# Patient Record
Sex: MALE
[De-identification: names, ages, dates, MRNs, and addresses within clinical notes are randomized per-mention and may not be internally consistent; named-entity substitution may affect disease eponyms.]

---

## 2023-03-20 ENCOUNTER — APPOINTMENT (OUTPATIENT)
Dept: ORTHOPEDIC SURGERY | Facility: CLINIC | Age: 73
End: 2023-03-20
Payer: MEDICARE

## 2023-03-20 VITALS — BODY MASS INDEX: 31.76 KG/M2 | HEIGHT: 64 IN | WEIGHT: 186 LBS

## 2023-03-20 DIAGNOSIS — J45.909 UNSPECIFIED ASTHMA, UNCOMPLICATED: ICD-10-CM

## 2023-03-20 DIAGNOSIS — M77.51 OTHER ENTHESOPATHY OF RT FOOT AND ANKLE: ICD-10-CM

## 2023-03-20 DIAGNOSIS — E78.00 PURE HYPERCHOLESTEROLEMIA, UNSPECIFIED: ICD-10-CM

## 2023-03-20 DIAGNOSIS — I10 ESSENTIAL (PRIMARY) HYPERTENSION: ICD-10-CM

## 2023-03-20 DIAGNOSIS — S93.401A SPRAIN OF UNSPECIFIED LIGAMENT OF RIGHT ANKLE, INITIAL ENCOUNTER: ICD-10-CM

## 2023-03-20 DIAGNOSIS — M17.11 UNILATERAL PRIMARY OSTEOARTHRITIS, RIGHT KNEE: ICD-10-CM

## 2023-03-20 PROBLEM — Z00.00 ENCOUNTER FOR PREVENTIVE HEALTH EXAMINATION: Status: ACTIVE | Noted: 2023-03-20

## 2023-03-20 PROCEDURE — 99203 OFFICE O/P NEW LOW 30 MIN: CPT

## 2023-03-20 NOTE — DISCUSSION/SUMMARY
[de-identified] : The patient was advised of the diagnosis.  The natural history of the pathology was explained in full to the patient in layman's terms. All questions were answered.  The risks and benefits of surgical and non-surgical treatment alternatives were explained in full to the patient.\par

## 2023-03-20 NOTE — IMAGING
[AP] : anteroposterior [Lateral] : lateral [[place]] : skyline [Outside films reviewed] : Outside films reviewed [Mild tricompartmental OA medial narrowing] : Mild tricompartmental OA medial narrowing [Right] : right ankle [There are no fractures, subluxations or dislocations. No significant abnormalities are seen] : There are no fractures, subluxations or dislocations. No significant abnormalities are seen [de-identified] : Right knee: No effusion.  Medial tenderness.  ROM 0-125.  NVI.  Lig stable.\par \par Right ankle: No swelling.  Lateral lig tenderness.  No tenderness over Achilles, post tib tendon, peroneal tendon.  NVI. [FreeTextEntry9] : calcified menisci.

## 2023-03-20 NOTE — ASSESSMENT
[FreeTextEntry1] : Mild/mod OA right knee with calcified menisci.  Discussed options and he will pursue OTC treatments, return if pain worsens.  Right ankle tendinitis from overuse - activity as tolerated.

## 2023-03-20 NOTE — HISTORY OF PRESENT ILLNESS
[Gradual] : gradual [2] : 2 [1] : 2 [Dull/Aching] : dull/aching [Occasional] : occasional [Rest] : rest [Walking] : walking [Full time] : Work status: full time [de-identified] : 3/20/23: Right knee and right ankle pain x 1 year, no injury.  No treatments yet.  Went to PCP who ordered XR and recc ortho eval.  Pain with acitivity, has good days and bad days. [FreeTextEntry1] : rt knee,rt foot [FreeTextEntry3] : 1 year ago [FreeTextEntry5] : no specific injury [de-identified] : x-ray

## 2024-10-18 ENCOUNTER — INPATIENT (INPATIENT)
Facility: HOSPITAL | Age: 74
LOS: 4 days | Discharge: HOME | DRG: 312 | End: 2024-10-23
Attending: HOSPITALIST | Admitting: HOSPITALIST
Payer: MEDICARE

## 2024-10-18 ENCOUNTER — RESULT REVIEW (OUTPATIENT)
Age: 74
End: 2024-10-18

## 2024-10-18 VITALS
HEART RATE: 79 BPM | TEMPERATURE: 98 F | DIASTOLIC BLOOD PRESSURE: 62 MMHG | SYSTOLIC BLOOD PRESSURE: 96 MMHG | RESPIRATION RATE: 18 BRPM | WEIGHT: 182.1 LBS | OXYGEN SATURATION: 91 % | HEIGHT: 67 IN

## 2024-10-18 DIAGNOSIS — R55 SYNCOPE AND COLLAPSE: ICD-10-CM

## 2024-10-18 LAB
ALBUMIN SERPL ELPH-MCNC: 4 G/DL — SIGNIFICANT CHANGE UP (ref 3.3–5.2)
ALP SERPL-CCNC: 91 U/L — SIGNIFICANT CHANGE UP (ref 40–120)
ALT FLD-CCNC: 33 U/L — SIGNIFICANT CHANGE UP
ANION GAP SERPL CALC-SCNC: 9 MMOL/L — SIGNIFICANT CHANGE UP (ref 5–17)
AST SERPL-CCNC: 27 U/L — SIGNIFICANT CHANGE UP
BASOPHILS # BLD AUTO: 0.02 K/UL — SIGNIFICANT CHANGE UP (ref 0–0.2)
BASOPHILS NFR BLD AUTO: 0.2 % — SIGNIFICANT CHANGE UP (ref 0–2)
BILIRUB SERPL-MCNC: 0.7 MG/DL — SIGNIFICANT CHANGE UP (ref 0.4–2)
BUN SERPL-MCNC: 19.7 MG/DL — SIGNIFICANT CHANGE UP (ref 8–20)
CALCIUM SERPL-MCNC: 9 MG/DL — SIGNIFICANT CHANGE UP (ref 8.4–10.5)
CHLORIDE SERPL-SCNC: 100 MMOL/L — SIGNIFICANT CHANGE UP (ref 96–108)
CO2 SERPL-SCNC: 27 MMOL/L — SIGNIFICANT CHANGE UP (ref 22–29)
CREAT SERPL-MCNC: 0.92 MG/DL — SIGNIFICANT CHANGE UP (ref 0.5–1.3)
EGFR: 88 ML/MIN/1.73M2 — SIGNIFICANT CHANGE UP
EOSINOPHIL # BLD AUTO: 0.05 K/UL — SIGNIFICANT CHANGE UP (ref 0–0.5)
EOSINOPHIL NFR BLD AUTO: 0.4 % — SIGNIFICANT CHANGE UP (ref 0–6)
GLUCOSE SERPL-MCNC: 166 MG/DL — HIGH (ref 70–99)
HCT VFR BLD CALC: 41 % — SIGNIFICANT CHANGE UP (ref 39–50)
HGB BLD-MCNC: 14 G/DL — SIGNIFICANT CHANGE UP (ref 13–17)
IMM GRANULOCYTES NFR BLD AUTO: 0.2 % — SIGNIFICANT CHANGE UP (ref 0–0.9)
LYMPHOCYTES # BLD AUTO: 0.87 K/UL — LOW (ref 1–3.3)
LYMPHOCYTES # BLD AUTO: 7.8 % — LOW (ref 13–44)
MCHC RBC-ENTMCNC: 30.2 PG — SIGNIFICANT CHANGE UP (ref 27–34)
MCHC RBC-ENTMCNC: 34.1 GM/DL — SIGNIFICANT CHANGE UP (ref 32–36)
MCV RBC AUTO: 88.6 FL — SIGNIFICANT CHANGE UP (ref 80–100)
MONOCYTES # BLD AUTO: 0.57 K/UL — SIGNIFICANT CHANGE UP (ref 0–0.9)
MONOCYTES NFR BLD AUTO: 5.1 % — SIGNIFICANT CHANGE UP (ref 2–14)
NEUTROPHILS # BLD AUTO: 9.6 K/UL — HIGH (ref 1.8–7.4)
NEUTROPHILS NFR BLD AUTO: 86.3 % — HIGH (ref 43–77)
PLATELET # BLD AUTO: 189 K/UL — SIGNIFICANT CHANGE UP (ref 150–400)
POTASSIUM SERPL-MCNC: 4 MMOL/L — SIGNIFICANT CHANGE UP (ref 3.5–5.3)
POTASSIUM SERPL-SCNC: 4 MMOL/L — SIGNIFICANT CHANGE UP (ref 3.5–5.3)
PROT SERPL-MCNC: 6.5 G/DL — LOW (ref 6.6–8.7)
RBC # BLD: 4.63 M/UL — SIGNIFICANT CHANGE UP (ref 4.2–5.8)
RBC # FLD: 13.5 % — SIGNIFICANT CHANGE UP (ref 10.3–14.5)
SODIUM SERPL-SCNC: 136 MMOL/L — SIGNIFICANT CHANGE UP (ref 135–145)
TROPONIN T, HIGH SENSITIVITY RESULT: 32 NG/L — SIGNIFICANT CHANGE UP (ref 0–51)
TROPONIN T, HIGH SENSITIVITY RESULT: 36 NG/L — SIGNIFICANT CHANGE UP (ref 0–51)
TSH SERPL-MCNC: 0.75 UIU/ML — SIGNIFICANT CHANGE UP (ref 0.27–4.2)
WBC # BLD: 11.13 K/UL — HIGH (ref 3.8–10.5)
WBC # FLD AUTO: 11.13 K/UL — HIGH (ref 3.8–10.5)

## 2024-10-18 PROCEDURE — 70450 CT HEAD/BRAIN W/O DYE: CPT | Mod: 26,MC

## 2024-10-18 PROCEDURE — 93306 TTE W/DOPPLER COMPLETE: CPT | Mod: 26

## 2024-10-18 PROCEDURE — 71045 X-RAY EXAM CHEST 1 VIEW: CPT | Mod: 26

## 2024-10-18 PROCEDURE — 99222 1ST HOSP IP/OBS MODERATE 55: CPT

## 2024-10-18 PROCEDURE — 99285 EMERGENCY DEPT VISIT HI MDM: CPT

## 2024-10-18 PROCEDURE — 93010 ELECTROCARDIOGRAM REPORT: CPT

## 2024-10-18 RX ORDER — ONDANSETRON HYDROCHLORIDE 2 MG/ML
4 INJECTION, SOLUTION INTRAMUSCULAR; INTRAVENOUS EVERY 8 HOURS
Refills: 0 | Status: DISCONTINUED | OUTPATIENT
Start: 2024-10-18 | End: 2024-10-23

## 2024-10-18 RX ORDER — MELATONIN 5 MG
3 TABLET ORAL AT BEDTIME
Refills: 0 | Status: DISCONTINUED | OUTPATIENT
Start: 2024-10-18 | End: 2024-10-23

## 2024-10-18 RX ORDER — MAGNESIUM, ALUMINUM HYDROXIDE 200-200 MG
30 TABLET,CHEWABLE ORAL EVERY 4 HOURS
Refills: 0 | Status: DISCONTINUED | OUTPATIENT
Start: 2024-10-18 | End: 2024-10-23

## 2024-10-18 RX ORDER — ACETAMINOPHEN 500 MG
650 TABLET ORAL EVERY 6 HOURS
Refills: 0 | Status: DISCONTINUED | OUTPATIENT
Start: 2024-10-18 | End: 2024-10-23

## 2024-10-18 RX ADMIN — Medication 1000 MILLILITER(S): at 14:56

## 2024-10-18 NOTE — H&P ADULT - HISTORY OF PRESENT ILLNESS
73 y/o male with PMH of HTN, HLD, gout, asthma,to the ED c/o syncope.  Patient was BIBEMS with initially elevated HR to 150.  Patient returned to normal sinus rhythm prior to arrival in ED.  Patient states he was eating lunch when he experienced sudden onset dizziness, vision changes and profuse sweating.  Patient states the next memory he has is waking up seated on a chair in a different location. Per patient, his colleague told him he had lost consciousness twice and was down for approximately 30 seconds during each episode.  Per patient report, there was no witnessed seizure-like activity and no tongue biting. Patient endorsing no symptoms at this time. Patient denies SOB, chest pain, fever, chills, nausea, vomiting, diarrhea, constipation, headache, weakness, dizziness, dysuria, hematuria Patient seen and examined before midnight.     75 y/o male with PMH of HTN, HLD, gout came to the ED s/p syncope. Patient reported abdominal cramp, followed by diaphoresis, dizziness and vision changes during lunch time. He stepped outside and the next thing he saw was his colleague standing next to him and EMS checking his BP. He was told by his colleague that he passed out twice, he was out for about 30 sec. Patient said the EMS mentioned his BP was "185/145". He has no chest pain, shortness of breath, palpitation, nausea, vomiting, tongue laceration, change/incontinence bowel/urinary, fever, chills, cough, HA, numbness. tingling. No prior similar episode.

## 2024-10-18 NOTE — H&P ADULT - NSHPPHYSICALEXAM_GEN_ALL_CORE
Vital Signs Last 24 Hrs  T(C): 36.6 (18 Oct 2024 15:46), Max: 36.6 (18 Oct 2024 15:46)  T(F): 97.8 (18 Oct 2024 15:46), Max: 97.8 (18 Oct 2024 15:46)  HR: 83 (18 Oct 2024 15:46) (79 - 83)  BP: 115/64 (18 Oct 2024 15:46) (96/62 - 115/64)  BP(mean): --  RR: 18 (18 Oct 2024 15:46) (18 - 18)  SpO2: 98% (18 Oct 2024 15:46) (91% - 98%)    Parameters below as of 18 Oct 2024 15:46  Patient On (Oxygen Delivery Method): room air

## 2024-10-18 NOTE — ED ADULT NURSE NOTE - NSFALLRISKINTERV_ED_ALL_ED

## 2024-10-18 NOTE — ED PROVIDER NOTE - ATTENDING CONTRIBUTION TO CARE
I, Tyler Munoz MD, performed the initial face to face bedside interview with this patient regarding history of present illness, review of symptoms and relevant past medical, social and family history.  I completed an independent physical examination.  I was the initial provider who evaluated this patient. I have signed out the follow up of any pending tests (i.e. labs, radiological studies) to the resident.  I have communicated the patient’s plan of care and disposition with the resident.    74 year old male PMHx HTN c/o syncope. PE unremarkable. labs, ECG and diagnostic imaging results reviewed

## 2024-10-18 NOTE — CONSULT NOTE ADULT - ATTENDING COMMENTS
74-year-old-year-old male with HTN, HLD, gout, asthma, ex-smoker presents to the ED c/o syncope.      Patient was BIBEMS with initially elevated HR to 150.  Patient returned to normal sinus rhythm prior to arrival in ED.      Patient states he was eating lunch when he experienced sudden onset dizziness, vision changes and profuse sweating.  Patient states the next memory he has is waking up seated on a chair in a different location. Per patient, his colleague told him he had lost consciousness twice and was down for approximately 30 seconds during each episode.  Per patient report, there was no witnessed seizure-like activity and no tongue biting. Patient endorsing no symptoms at this time. Patient denies SOB, chest pain, fever, chills, nausea, vomiting, diarrhea, constipation, headache, weakness, dizziness, dysuria, hematuria.    Syncope  Cardiac vs Other etiology  - continue telemetry monitoring  - EKG  - TSH   - Lipid panel  - A1C  - Orthostatics  - TTE  - recommend Head CT  - 30 Day MCOT  - troponin x1 negative; repeat one more troponin  - we will follow up on the echo and labs

## 2024-10-18 NOTE — ED PROVIDER NOTE - PHYSICAL EXAMINATION
General: Awake, alert, lying in bed in NAD  HEENT: Normocephalic, atraumatic. No scleral icterus or conjunctival injection. EOMI.  Neck:. Soft and supple.  Cardiac: RRR, S1/S2 present  Resp: Lungs CTAB. Symmetric chest expansion with inspiration. No accessory muscle use  Abd: Soft, non-tender, non-distended. No guarding, rebound, or rigidity.  Skin: No rashes, abrasions, or lacerations.  Extremities: Palpable DP pulses bilaterally. No LE edema bilaterally.  Neuro: AO x 4. Moves all extremities symmetrically. Motor strength and sensation grossly intact.  Psych: Appropriate mood and affect

## 2024-10-18 NOTE — CONSULT NOTE ADULT - TIME BILLING
syncope  HTN, HLD, gout, asthma, ex-smoker syncope  HTN, HLD, gout, asthma, ex-smoker    If this is vagovagal syncope, which it likely is, and he had no prior syncope in the past 12 months, then no driving restrictions. Ref. Yakelin AE, Gurwinder WM, Tiffanie DG, et al. Personal and public safety issues related to arrhythmias that may affect consciousness: implications for regulation and physician recommendations. A medical/scientific statement from the American Heart Association and the North American Society of Pacing and Electrophysiology. Circulation 1996; 94:1147-66.

## 2024-10-18 NOTE — ED PROVIDER NOTE - OBJECTIVE STATEMENT
74-year-old-year-old male with PMH of HTN, HLD, gout presents to the ED c/o syncope.  Patient was GERARD BIBEMS with initially elevated HR to 150.  Patient returned to normal sinus rhythm prior to arrival in ED.  Patient states 74-year-old-year-old male with PMH of HTN, HLD, gout, ex-smoker presents to the ED c/o syncope.  Patient was GERARD BIBEMS with initially elevated HR to 150.  Patient returned to normal sinus rhythm prior to arrival in ED.  Patient states 74-year-old-year-old male with PMH of HTN, HLD, gout, asthma, ex-smoker presents to the ED c/o syncope.  Patient was BIBEMS with initially elevated HR to 150.  Patient returned to normal sinus rhythm prior to arrival in ED.  Patient states he was eating lunch when he experienced sudden onset dizziness, vision changes and profuse sweating.  Patient states the next memory he has is waking up seated on a chair in a different location. Per patient, his colleague told him he had lost consciousness twice and was down for approximately 30 seconds during each episode.  Per patient report, there was no witnessed seizure-like activity and no tongue biting. Patient endorsing no symptoms at this time. Patient denies SOB, chest pain, fever, chills, nausea, vomiting, diarrhea, constipation, headache, weakness, dizziness, dysuria, hematuria.

## 2024-10-18 NOTE — ED ADULT NURSE NOTE - CHIEF COMPLAINT QUOTE
BIBA seconadary to lower abdominal pain dizziness and multiple episodes of syncope while at work today. Upon EMS arrival pt was cool pale and diaphoretic with 157 HR. Pt currently in NSR in [facility]. Pt denies Abd pain, chest pain, dizziness or SOB at this time.

## 2024-10-18 NOTE — ED ADULT NURSE NOTE - OBJECTIVE STATEMENT
Pt A+Ox4, respirations are equal and unlabored on room air. Pt states that he started to experience ABD pain and dizziness and passed out 2x while at work. Pt states that he felt off and sat down, denies falling when syncope occurred. Pt states LOC lasted for not more then a few seconds. Pt states "I feel fine at this time". Pt states the symptoms he experienced has now subsided. Pt denies SOB, CP, ABD pain, HA, N/V/D. Pt on telebox at this time. Pt left in position of comfort, wheels of stretcher locked and in the lowest position. Call bell within reach.

## 2024-10-18 NOTE — CONSULT NOTE ADULT - SUBJECTIVE AND OBJECTIVE BOX
[facility]                                              CARDIOLOGY AT [address], [place] [address]                                             Telephone: [phone]. Fax:[phone]                                                       CARDIOLOGY CONSULTATION NOTE                                                                                             History obtained by: Patient and medical record  Community Cardiologist: no   obtained: Yes [  ] No [x  ]  Reason for Consultation: syncope  Available out pt records reviewed: Yes [  x] No [  ]    Chief complaint:    Patient is a 74y old  Male who presents with a chief complaint of syncope    HPI: 74-year-old-year-old male with PMH of HTN, HLD, gout, asthma, ex-smoker presents to the ED c/o syncope.  Patient was BIBEMS with initially elevated HR to 150.  Patient returned to normal sinus rhythm prior to arrival in ED.  Patient states he was eating lunch when he experienced sudden onset dizziness, vision changes and profuse sweating.  Patient states the next memory he has is waking up seated on a chair in a different location. Per patient, his colleague told him he had lost consciousness twice and was down for approximately 30 seconds during each episode.  Per patient report, there was no witnessed seizure-like activity and no tongue biting. Patient endorsing no symptoms at this time. Patient denies SOB, chest pain, fever, chills, nausea, vomiting, diarrhea, constipation, headache, weakness, dizziness, dysuria, hematuria.    Pt was examined at the bedside. Reports feeling stomach cramps after lunch at work and then seeing very bright in his eyes. Pt says he went outside to sit with his coworkers and per his coworkers passed out twice for few seconds. Denies seizure, denies, falling, denies feeling dizzy, lightheaded before. Pt denies chest pain, SOB, N/V/D chills or fevers, lower extremity edema.       CARDIAC TESTING   ECHO:    STRESS:    CATH:     ELECTROPHYSIOLOGY:     PAST MEDICAL HISTORY  Hypertension    Dyslipidemia        PAST SURGICAL HISTORY  No significant past surgical history        SOCIAL HISTORY:  Denies smoking/alcohol/drugs  CIGARETTES:     ALCOHOL:  DRUGS:    FAMILY HISTORY:  No pertinent family history in first degree relatives      Family History of Cardiovascular Disease:  Yes [  ] No [  ]  Coronary Artery Disease in first degree relative: Yes [  ] No [  ]  Sudden Cardiac Death in First degree relative: Yes [  ] No [  ]    HOME MEDICATIONS:      CURRENT CARDIAC MEDICATIONS:      CURRENT OTHER MEDICATIONS:      ALLERGIES:   No Known Allergies      REVIEW OF SYMPTOMS:   CONSTITUTIONAL: No fever, no chills, no weight loss, no weight gain, no fatigue   ENMT:  No vertigo; No sinus or throat pain  NECK: No pain or stiffness  CARDIOVASCULAR: No chest pain, no dyspnea, no syncope/presyncope, no palpitations, no dizziness, no Orthopnea, no Paroxsymal nocturnal dyspnea  RESPIRATORY: no Shortness of breath, no cough, no wheezing  : No dysuria, no hematuria   GI: No dark color stool, no nausea, no diarrhea, no constipation, no abdominal pain   NEURO: No headache, no slurred speech   MUSCULOSKELETAL: No joint pain or swelling; No muscle, back, or extremity pain  PSYCH: No agitation, no anxiety.    ALL OTHER REVIEW OF SYSTEMS ARE NEGATIVE.    VITAL SIGNS:  T(C): 36.6 (10-18-24 @ 15:46), Max: 36.6 (10-18-24 @ 15:46)  T(F): 97.8 (10-18-24 @ 15:46), Max: 97.8 (10-18-24 @ 15:46)  HR: 83 (10-18-24 @ 15:46) (79 - 83)  BP: 115/64 (10-18-24 @ 15:46) (96/62 - 115/64)  RR: 18 (10-18-24 @ 15:46) (18 - 18)  SpO2: 98% (10-18-24 @ 15:46) (91% - 98%)    INTAKE AND OUTPUT:       PHYSICAL EXAM:  Constitutional: Comfortable . No acute distress.   HEENT: Atraumatic and normocephalic , neck is supple . no JVD. No carotid bruit.  CNS: A&Ox3. No focal deficits.   Respiratory: CTAB, unlabored   Cardiovascular: RRR normal s1 s2. No murmur. No rubs or gallop.  Gastrointestinal: Soft, non-tender. +Bowel sounds.   Extremities: 2+ Peripheral Pulses, No clubbing, cyanosis, or edema  Psychiatric: Calm . no agitation.   Skin: Warm and dry, no ulcers on extremities     LABS:                            14.0   11.13 )-----------( 189      ( 18 Oct 2024 14:51 )             41.0     10-18    136  |  100  |  19.7  ----------------------------<  166[H]  4.0   |  27.0  |  0.92    Ca    9.0      18 Oct 2024 14:51    TPro  6.5[L]  /  Alb  4.0  /  TBili  0.7  /  DBili  x   /  AST  27  /  ALT  33  /  AlkPhos  91  10-18      Urinalysis Basic - ( 18 Oct 2024 14:51 )    Color: x / Appearance: x / SG: x / pH: x  Gluc: 166 mg/dL / Ketone: x  / Bili: x / Urobili: x   Blood: x / Protein: x / Nitrite: x   Leuk Esterase: x / RBC: x / WBC x   Sq Epi: x / Non Sq Epi: x / Bacteria: x              INTERPRETATION OF TELEMETRY:     ECG:   Prior ECG: Yes [  ] No [  ]    RADIOLOGY & ADDITIONAL STUDIES:    X-ray:    CT scan:   MRI:   US:                                                [facility]                                              CARDIOLOGY AT [place] [address]                                             Telephone: [phone]. Fax:[phone]                                                       CARDIOLOGY CONSULTATION NOTE                                                                                             History obtained by: Patient and medical record  Community Cardiologist: no   obtained: Yes [  ] No [x  ]  Reason for Consultation: syncope  Available out pt records reviewed: Yes [  x] No [  ]    Chief complaint:    Patient is a 74y old  Male who presents with a chief complaint of syncope    HPI: 74-year-old-year-old male with PMH of HTN, HLD, gout, asthma, ex-smoker presents to the ED c/o syncope.  Patient was BIBEMS with initially elevated HR to 150.  Patient returned to normal sinus rhythm prior to arrival in ED.  Patient states he was eating lunch when he experienced sudden onset dizziness, vision changes and profuse sweating.  Patient states the next memory he has is waking up seated on a chair in a different location. Per patient, his colleague told him he had lost consciousness twice and was down for approximately 30 seconds during each episode.  Per patient report, there was no witnessed seizure-like activity and no tongue biting. Patient endorsing no symptoms at this time. Patient denies SOB, chest pain, fever, chills, nausea, vomiting, diarrhea, constipation, headache, weakness, dizziness, dysuria, hematuria.    Pt was examined at the bedside. Reports feeling stomach cramps after lunch at work and then seeing "very bright" in his eyes. Pt says he went outside to sit with his coworkers and per his coworkers passed out twice for few seconds. Denies seizure, denies, falling, denies feeling dizzy, lightheaded before. Pt denies chest pain, SOB, N/V/D chills or fevers, lower extremity edema. Pt reports occasionally getting lightheaded when changing position from lying down to sitting up. Reports never having a syncopal episode in the past. Reports never following with cardiology.      CARDIAC TESTING denies      ELECTROPHYSIOLOGY: denies    PAST MEDICAL HISTORY  Hypertension    Dyslipidemia        PAST SURGICAL HISTORY  No significant past surgical history        SOCIAL HISTORY:  Denies smoking/alcohol/drugs  CIGARETTES:   former   ALCOHOL: rarely  DRUGS: denies    FAMILY HISTORY:  No pertinent family history in first degree relatives  History of Colon Cancer - Dad, Grandfather    HOME MEDICATIONS:  Allopurinol 300 mg   Valsartan -hydrochlorothiazide 160-12.5 mg  Atorvastatin 80 mg      CURRENT CARDIAC MEDICATIONS:      CURRENT OTHER MEDICATIONS:      ALLERGIES:   No Known Allergies      REVIEW OF SYMPTOMS:   CONSTITUTIONAL: No fever, no chills, no weight loss, no weight gain, no fatigue   ENMT:  No vertigo; No sinus or throat pain  NECK: No pain or stiffness  CARDIOVASCULAR: No chest pain, no dyspnea, no syncope/presyncope, no palpitations, no dizziness, no Orthopnea, no Paroxsymal nocturnal dyspnea  RESPIRATORY: no Shortness of breath, no cough, no wheezing  : No dysuria, no hematuria   GI: No dark color stool, no nausea, no diarrhea, no constipation, no abdominal pain   NEURO: No headache, no slurred speech   MUSCULOSKELETAL: No joint pain or swelling; No muscle, back, or extremity pain  PSYCH: No agitation, no anxiety.    ALL OTHER REVIEW OF SYSTEMS ARE NEGATIVE.    VITAL SIGNS:  T(C): 36.6 (10-18-24 @ 15:46), Max: 36.6 (10-18-24 @ 15:46)  T(F): 97.8 (10-18-24 @ 15:46), Max: 97.8 (10-18-24 @ 15:46)  HR: 83 (10-18-24 @ 15:46) (79 - 83)  BP: 115/64 (10-18-24 @ 15:46) (96/62 - 115/64)  RR: 18 (10-18-24 @ 15:46) (18 - 18)  SpO2: 98% (10-18-24 @ 15:46) (91% - 98%)    INTAKE AND OUTPUT:       PHYSICAL EXAM:  Constitutional: Comfortable . No acute distress.   HEENT: Atraumatic and normocephalic , neck is supple . no JVD. No carotid bruit.  CNS: A&Ox3. No focal deficits.   Respiratory: CTAB, unlabored   Cardiovascular: RRR normal s1 s2. No murmur. No rubs or gallop.  Gastrointestinal: Soft, non-tender. +Bowel sounds.   Extremities: 2+ Peripheral Pulses, No clubbing, cyanosis, or edema  Psychiatric: Calm . no agitation.   Skin: Warm and dry, no ulcers on extremities     LABS:                            14.0   11.13 )-----------( 189      ( 18 Oct 2024 14:51 )             41.0     10-18    136  |  100  |  19.7  ----------------------------<  166[H]  4.0   |  27.0  |  0.92    Ca    9.0      18 Oct 2024 14:51    TPro  6.5[L]  /  Alb  4.0  /  TBili  0.7  /  DBili  x   /  AST  27  /  ALT  33  /  AlkPhos  91  10-18      Urinalysis Basic - ( 18 Oct 2024 14:51 )    Color: x / Appearance: x / SG: x / pH: x  Gluc: 166 mg/dL / Ketone: x  / Bili: x / Urobili: x   Blood: x / Protein: x / Nitrite: x   Leuk Esterase: x / RBC: x / WBC x   Sq Epi: x / Non Sq Epi: x / Bacteria: x              INTERPRETATION OF TELEMETRY:     ECG:   Prior ECG: Yes [  ] No [ x ]    RADIOLOGY & ADDITIONAL STUDIES:    X-ray:    CT scan:   MRI:   US:

## 2024-10-18 NOTE — ED PROVIDER NOTE - NS ED ROS FT
Current ROS in ED    General: Denies fever, chills  HEENT: Denies sore throat  Neck: Denies neck pain  Resp: Denies coughing, SOB  Cardiovascular: Denies CP, palpitations, LE edema  GI: Denies nausea, vomiting, abdominal pain, diarrhea, constipation, blood in stool  : Denies dysuria, hematuria  MSK: Denies back pain  Neuro: Denies headache, dizziness, numbness, weakness  Skin: Denies rashes.

## 2024-10-18 NOTE — CONSULT NOTE ADULT - ASSESSMENT
74-year-old-year-old male with PMH of HTN, HLD, gout, asthma, ex-smoker presents to the ED c/o syncope. Cardiology was consulted for syncope.       Syncope  Cardiac vs Other etiology  - Pt on telemetry monitoring  - EKG  - TSH   - Lipid panel  - A1C  - Orthostatics  - TTE  - recommend Head CT  - 30 Day MCOT  - troponin x1 negative

## 2024-10-18 NOTE — H&P ADULT - NSICDXPASTMEDICALHX_GEN_ALL_CORE_FT
PAST MEDICAL HISTORY:  Dyslipidemia     Hypertension      PAST MEDICAL HISTORY:  Dyslipidemia     Gout     Hypertension

## 2024-10-18 NOTE — ED ADULT TRIAGE NOTE - CHIEF COMPLAINT QUOTE
BIBA seconadary to lower abdominal pain dizziness and multiple episodes of syncope while at work today. Upon EMS arrival pt was cool pale and diaphoretic with 157 HR. Pt currently in NSR in [facility]. Pt denies Abd pain, chest pain, dizziness or SOB at this time.

## 2024-10-18 NOTE — H&P ADULT - NSICDXFAMILYHX_GEN_ALL_CORE_FT
FAMILY HISTORY:  No pertinent family history in first degree relatives     FAMILY HISTORY:  Sibling  Still living? Yes, Estimated age: Age Unknown  FH: HTN (hypertension), Age at diagnosis: Age Unknown

## 2024-10-18 NOTE — ED PROVIDER NOTE - CLINICAL SUMMARY MEDICAL DECISION MAKING FREE TEXT BOX
74-year-old-year-old male with PMH of HTN, HLD, gout, asthma, ex-smoker presents to the ED c/o syncope.  Patient was BIBEMS with initially elevated HR to 150.  Patient returned to normal sinus rhythm prior to arrival in ED.  Patient states he was eating lunch when he experienced sudden onset dizziness, vision changes and profuse sweating. Per patient, his colleague told him he had lost consciousness twice and was down for approximately 30 seconds during each episode. Patient endorsing no symptoms at this time. Patient is HD acceptable.  EKG shows NSR with Q waves.  Cardiology was consulted for further evaluation.  Will reassess.

## 2024-10-18 NOTE — ED ADULT TRIAGE NOTE - NS ED TRIAGE AVPU SCALE
Follow up scheduled.     ----- Message from Judith Howard NP sent at 12/1/2020  8:32 AM CST -----  Needs a follow up      
Alert-The patient is alert, awake and responds to voice. The patient is oriented to time, place, and person. The triage nurse is able to obtain subjective information.

## 2024-10-18 NOTE — H&P ADULT - ASSESSMENT
75 y/o male with PMH of HTN, HLD, gout came to the ED s/p syncope. In the ED, CT head: no acute intracranial finding.     Syncope   Admit to telemetry   ECG: NSR with premature atrial complex   Troponin x 2 done (32--->36)   Echo done with wall motion abnormality   ED attending reportedly communicate the result of echo to cardiology, will see in AM     HTN/HLD   Continue Valsartan-HCTZ 160-12.5mg   Will hold HCTZ if patient is scheduled for cardiac cath   Atorvastatin 80mg   Monitor BP     Pre-DM-2   HbA1C: 6.0  Lifestyle modification     Gout  Allopurinol 300mg     Supportive   DVT prophylaxis: Lovenox   Diet: cardiac     Plan of care discussed with patient, brother at bedside and ER nurse     Dispo: when stable, home.

## 2024-10-19 DIAGNOSIS — I10 ESSENTIAL (PRIMARY) HYPERTENSION: ICD-10-CM

## 2024-10-19 DIAGNOSIS — E78.5 HYPERLIPIDEMIA, UNSPECIFIED: ICD-10-CM

## 2024-10-19 DIAGNOSIS — R55 SYNCOPE AND COLLAPSE: ICD-10-CM

## 2024-10-19 LAB
ANION GAP SERPL CALC-SCNC: 10 MMOL/L — SIGNIFICANT CHANGE UP (ref 5–17)
BUN SERPL-MCNC: 16.7 MG/DL — SIGNIFICANT CHANGE UP (ref 8–20)
CALCIUM SERPL-MCNC: 8.7 MG/DL — SIGNIFICANT CHANGE UP (ref 8.4–10.5)
CHLORIDE SERPL-SCNC: 104 MMOL/L — SIGNIFICANT CHANGE UP (ref 96–108)
CO2 SERPL-SCNC: 26 MMOL/L — SIGNIFICANT CHANGE UP (ref 22–29)
CREAT SERPL-MCNC: 0.73 MG/DL — SIGNIFICANT CHANGE UP (ref 0.5–1.3)
EGFR: 97 ML/MIN/1.73M2 — SIGNIFICANT CHANGE UP
FOLATE SERPL-MCNC: 14.9 NG/ML — SIGNIFICANT CHANGE UP
GLUCOSE SERPL-MCNC: 118 MG/DL — HIGH (ref 70–99)
HCT VFR BLD CALC: 40.6 % — SIGNIFICANT CHANGE UP (ref 39–50)
HGB BLD-MCNC: 13.9 G/DL — SIGNIFICANT CHANGE UP (ref 13–17)
MCHC RBC-ENTMCNC: 30.7 PG — SIGNIFICANT CHANGE UP (ref 27–34)
MCHC RBC-ENTMCNC: 34.2 GM/DL — SIGNIFICANT CHANGE UP (ref 32–36)
MCV RBC AUTO: 89.6 FL — SIGNIFICANT CHANGE UP (ref 80–100)
PLATELET # BLD AUTO: 192 K/UL — SIGNIFICANT CHANGE UP (ref 150–400)
POTASSIUM SERPL-MCNC: 4 MMOL/L — SIGNIFICANT CHANGE UP (ref 3.5–5.3)
POTASSIUM SERPL-SCNC: 4 MMOL/L — SIGNIFICANT CHANGE UP (ref 3.5–5.3)
RBC # BLD: 4.53 M/UL — SIGNIFICANT CHANGE UP (ref 4.2–5.8)
RBC # FLD: 13.4 % — SIGNIFICANT CHANGE UP (ref 10.3–14.5)
SODIUM SERPL-SCNC: 140 MMOL/L — SIGNIFICANT CHANGE UP (ref 135–145)
TROPONIN T, HIGH SENSITIVITY RESULT: 36 NG/L — SIGNIFICANT CHANGE UP (ref 0–51)
WBC # BLD: 7.94 K/UL — SIGNIFICANT CHANGE UP (ref 3.8–10.5)
WBC # FLD AUTO: 7.94 K/UL — SIGNIFICANT CHANGE UP (ref 3.8–10.5)

## 2024-10-19 PROCEDURE — 99232 SBSQ HOSP IP/OBS MODERATE 35: CPT | Mod: 25

## 2024-10-19 PROCEDURE — 99233 SBSQ HOSP IP/OBS HIGH 50: CPT

## 2024-10-19 RX ORDER — ASPIRIN/MAG CARB/ALUMINUM AMIN 325 MG
81 TABLET ORAL DAILY
Refills: 0 | Status: DISCONTINUED | OUTPATIENT
Start: 2024-10-19 | End: 2024-10-23

## 2024-10-19 RX ORDER — ENOXAPARIN SODIUM 40MG/0.4ML
40 SYRINGE (ML) SUBCUTANEOUS EVERY 24 HOURS
Refills: 0 | Status: DISCONTINUED | OUTPATIENT
Start: 2024-10-19 | End: 2024-10-23

## 2024-10-19 RX ORDER — VALSARTAN 160 MG/1
160 TABLET ORAL DAILY
Refills: 0 | Status: DISCONTINUED | OUTPATIENT
Start: 2024-10-19 | End: 2024-10-19

## 2024-10-19 RX ORDER — INFLUENZ VIR VAC TV P-SURF2003 15MCG/.5ML
0.5 SYRINGE (ML) INTRAMUSCULAR ONCE
Refills: 0 | Status: DISCONTINUED | OUTPATIENT
Start: 2024-10-19 | End: 2024-10-23

## 2024-10-19 RX ORDER — ALLOPURINOL 100 MG
1 TABLET ORAL
Refills: 0 | DISCHARGE

## 2024-10-19 RX ORDER — VALSARTAN 160 MG/1
80 TABLET ORAL DAILY
Refills: 0 | Status: DISCONTINUED | OUTPATIENT
Start: 2024-10-20 | End: 2024-10-23

## 2024-10-19 RX ORDER — ALLOPURINOL 100 MG
300 TABLET ORAL DAILY
Refills: 0 | Status: DISCONTINUED | OUTPATIENT
Start: 2024-10-19 | End: 2024-10-23

## 2024-10-19 RX ORDER — VALSARTAN AND HYDROCHLOROTHIAZIDE 12.5; 8 MG/1; MG/1
1 TABLET, FILM COATED ORAL
Refills: 0 | DISCHARGE

## 2024-10-19 RX ADMIN — Medication 81 MILLIGRAM(S): at 13:18

## 2024-10-19 RX ADMIN — Medication 40 MILLIGRAM(S): at 13:18

## 2024-10-19 RX ADMIN — Medication 300 MILLIGRAM(S): at 13:18

## 2024-10-19 RX ADMIN — VALSARTAN 160 MILLIGRAM(S): 160 TABLET ORAL at 06:04

## 2024-10-19 RX ADMIN — Medication 80 MILLIGRAM(S): at 22:14

## 2024-10-19 NOTE — PATIENT PROFILE ADULT - FUNCTIONAL ASSESSMENT - DAILY ACTIVITY SCORE.
24 Oxybutynin Counseling:  I discussed with the patient the risks of oxybutynin including but not limited to skin rash, drowsiness, dry mouth, difficulty urinating, and blurred vision.

## 2024-10-19 NOTE — PROGRESS NOTE ADULT - SUBJECTIVE AND OBJECTIVE BOX
[facility]                                                         CARDIOLOGY AT [facility]                                                                  [address], [place] [address]                                                         Telephone: [phone]. Fax:[phone]                                                                             PROGRESS NOTE    Reason for follow up: Syncope   Update: Pt seen and examined at bedside. Denies any cardiac complaints. Tele with no events.       Review of symptoms:   Cardiac:  No chest pain. No dyspnea. No palpitations.  Respiratory: no cough. No dyspnea  Gastrointestinal: No diarrhea. No abdominal pain. No bleeding.   Neuro: No focal neuro complaints.    Vitals:  T(C): 36.6 (10-19-24 @ 11:51), Max: 36.8 (10-19-24 @ 05:06)  HR: 73 (10-19-24 @ 11:51) (73 - 83)  BP: 118/71 (10-19-24 @ 11:51) (109/68 - 130/80)  RR: 18 (10-19-24 @ 11:51) (17 - 18)  SpO2: 98% (10-19-24 @ 11:51) (96% - 98%)  Wt(kg): --  I&O's Summary    Weight (kg): 82.6 (10-18 @ 13:24)    PHYSICAL EXAM:  Appearance: Comfortable. No acute distress  HEENT:  Atraumatic. Normocephalic.  Normal oral mucosa  Neurologic: A & O x 3, no gross focal deficits.  Cardiovascular: RRR S1 S2, No murmur, no rubs/gallops. No JVD  Respiratory: Lungs clear to auscultation, unlabored   Gastrointestinal:  Soft, Non-tender, + BS  Lower Extremities: 2+ Peripheral Pulses, No clubbing, cyanosis, or edema  Psychiatry: Patient is calm. No agitation.   Skin: warm and dry.    CURRENT CARDIAC MEDICATIONS:  hydrochlorothiazide 12.5 milliGRAM(s) Oral daily      CURRENT OTHER MEDICATIONS:  acetaminophen     Tablet .. 650 milliGRAM(s) Oral every 6 hours PRN Temp greater or equal to 38C (100.4F), Mild Pain (1 - 3)  melatonin 3 milliGRAM(s) Oral at bedtime PRN Insomnia  ondansetron Injectable 4 milliGRAM(s) IV Push every 8 hours PRN Nausea and/or Vomiting  aluminum hydroxide/magnesium hydroxide/simethicone Suspension 30 milliLiter(s) Oral every 4 hours PRN Dyspepsia  allopurinol 300 milliGRAM(s) Oral daily  atorvastatin 80 milliGRAM(s) Oral at bedtime  aspirin enteric coated 81 milliGRAM(s) Oral daily  enoxaparin Injectable 40 milliGRAM(s) SubCutaneous every 24 hours  influenza  Vaccine (HIGH DOSE) 0.5 milliLiter(s) IntraMuscular once      LABS:	 	                            13.9   7.94  )-----------( 192      ( 19 Oct 2024 04:46 )             40.6     10-19    140  |  104  |  16.7  ----------------------------<  118[H]  4.0   |  26.0  |  0.73    Ca    8.7      19 Oct 2024 04:46    TPro  6.5[L]  /  Alb  4.0  /  TBili  0.7  /  DBili  x   /  AST  27  /  ALT  33  /  AlkPhos  91  10-18      Lipid Profile: Date: 10-18 @ 14:51  Total cholesterol 161; Direct LDL: --; HDL: 43; Triglycerides:109    TSH: Thyroid Stimulating Hormone, Serum: 0.75 uIU/mL  Thyroid Stimulating Hormone, Serum: 0.94 uIU/mL      TELEMETRY: SR, no events    DIAGNOSTIC TESTING:  [ ] Echocardiogram: < from: TTE W or WO Ultrasound Enhancing Agent (10.18.24 @ 19:26) >   1. Left ventricular wall thickness is mildly increased. Left ventricular systolic function is low normal with an ejection fraction of 53 % by Osullivan's method of disks with an ejection fraction visually estimated at 50 to 55 %.   2. Mid inferolateral segment is abnormal.   3. Normal right ventricular cavity size and normal right ventricular systolic function.   4. Normal left and right atrial size.   5. There is focal calcification of the aortic valve leaflets.   6. There is calcification along the mitral-aortic intervalvular fibrosa.   7. There is moderate calcification of the mitral valve annulus.   8. Pulmonary artery systolic pressure could not be estimated.   9. No pericardial effusion seen.  10. No prior echocardiogram is available for comparison.    < end of copied text >                                                                    [facility]                                                         CARDIOLOGY AT [facility]                                                                  [address], [place] [address]                                                         Telephone: [phone]. Fax:[phone]                                                                             PROGRESS NOTE    Reason for follow up: Syncope   Update: Pt seen and examined at bedside. Denies any cardiac complaints. Tele with no events      Review of symptoms:   Cardiac:  No chest pain. No dyspnea. No palpitations.  Respiratory: no cough. No dyspnea  Gastrointestinal: No diarrhea. No abdominal pain. No bleeding.   Neuro: No focal neuro complaints.    Vitals:  T(C): 36.6 (10-19-24 @ 11:51), Max: 36.8 (10-19-24 @ 05:06)  HR: 73 (10-19-24 @ 11:51) (73 - 83)  BP: 118/71 (10-19-24 @ 11:51) (109/68 - 130/80)  RR: 18 (10-19-24 @ 11:51) (17 - 18)  SpO2: 98% (10-19-24 @ 11:51) (96% - 98%)  Wt(kg): --  I&O's Summary    Weight (kg): 82.6 (10-18 @ 13:24)    PHYSICAL EXAM:  Appearance: Comfortable. No acute distress  HEENT:  Atraumatic. Normocephalic.  Normal oral mucosa  Neurologic: A & O x 3, no gross focal deficits.  Cardiovascular: RRR S1 S2, No murmur, no rubs/gallops. No JVD  Respiratory: Lungs clear to auscultation, unlabored   Gastrointestinal:  Soft, Non-tender, + BS  Lower Extremities: 2+ Peripheral Pulses, No clubbing, cyanosis, or edema  Psychiatry: Patient is calm. No agitation.   Skin: warm and dry.    CURRENT CARDIAC MEDICATIONS:  hydrochlorothiazide 12.5 milliGRAM(s) Oral daily      CURRENT OTHER MEDICATIONS:  acetaminophen     Tablet .. 650 milliGRAM(s) Oral every 6 hours PRN Temp greater or equal to 38C (100.4F), Mild Pain (1 - 3)  melatonin 3 milliGRAM(s) Oral at bedtime PRN Insomnia  ondansetron Injectable 4 milliGRAM(s) IV Push every 8 hours PRN Nausea and/or Vomiting  aluminum hydroxide/magnesium hydroxide/simethicone Suspension 30 milliLiter(s) Oral every 4 hours PRN Dyspepsia  allopurinol 300 milliGRAM(s) Oral daily  atorvastatin 80 milliGRAM(s) Oral at bedtime  aspirin enteric coated 81 milliGRAM(s) Oral daily  enoxaparin Injectable 40 milliGRAM(s) SubCutaneous every 24 hours  influenza  Vaccine (HIGH DOSE) 0.5 milliLiter(s) IntraMuscular once      LABS:	 	                            13.9   7.94  )-----------( 192      ( 19 Oct 2024 04:46 )             40.6     10-19    140  |  104  |  16.7  ----------------------------<  118[H]  4.0   |  26.0  |  0.73    Ca    8.7      19 Oct 2024 04:46    TPro  6.5[L]  /  Alb  4.0  /  TBili  0.7  /  DBili  x   /  AST  27  /  ALT  33  /  AlkPhos  91  10-18      Lipid Profile: Date: 10-18 @ 14:51  Total cholesterol 161; Direct LDL: --; HDL: 43; Triglycerides:109    TSH: Thyroid Stimulating Hormone, Serum: 0.75 uIU/mL  Thyroid Stimulating Hormone, Serum: 0.94 uIU/mL      TELEMETRY: SR, no events    DIAGNOSTIC TESTING:  [ ] Echocardiogram: < from: TTE W or WO Ultrasound Enhancing Agent (10.18.24 @ 19:26) >   1. Left ventricular wall thickness is mildly increased. Left ventricular systolic function is low normal with an ejection fraction of 53 % by Osullivan's method of disks with an ejection fraction visually estimated at 50 to 55 %.   2. Mid inferolateral segment is abnormal.   3. Normal right ventricular cavity size and normal right ventricular systolic function.   4. Normal left and right atrial size.   5. There is focal calcification of the aortic valve leaflets.   6. There is calcification along the mitral-aortic intervalvular fibrosa.   7. There is moderate calcification of the mitral valve annulus.   8. Pulmonary artery systolic pressure could not be estimated.   9. No pericardial effusion seen.  10. No prior echocardiogram is available for comparison.    < end of copied text >

## 2024-10-19 NOTE — PATIENT PROFILE ADULT - FALL HARM RISK - UNIVERSAL INTERVENTIONS
Bed in lowest position, wheels locked, appropriate side rails in place/Call bell, personal items and telephone in reach/Instruct patient to call for assistance before getting out of bed or chair/Non-slip footwear when patient is out of bed/[place] to call system/Physically safe environment - no spills, clutter or unnecessary equipment/Purposeful Proactive Rounding/Room/bathroom lighting operational, light cord in reach

## 2024-10-19 NOTE — PROGRESS NOTE ADULT - SUBJECTIVE AND OBJECTIVE BOX
Patient is a 74y old  Male who presents with a chief complaint of     Patient seen and examined at bedside benito m   he is feeling well , denies abdominal pain , no SOB , no CP      No overnight events reported.     ALLERGIES:  No Known Allergies    MEDICATIONS  (STANDING):  allopurinol 300 milliGRAM(s) Oral daily  atorvastatin 80 milliGRAM(s) Oral at bedtime  hydrochlorothiazide 12.5 milliGRAM(s) Oral daily  influenza  Vaccine (HIGH DOSE) 0.5 milliLiter(s) IntraMuscular once    MEDICATIONS  (PRN):  acetaminophen     Tablet .. 650 milliGRAM(s) Oral every 6 hours PRN Temp greater or equal to 38C (100.4F), Mild Pain (1 - 3)  aluminum hydroxide/magnesium hydroxide/simethicone Suspension 30 milliLiter(s) Oral every 4 hours PRN Dyspepsia  melatonin 3 milliGRAM(s) Oral at bedtime PRN Insomnia  ondansetron Injectable 4 milliGRAM(s) IV Push every 8 hours PRN Nausea and/or Vomiting    Vital Signs Last 24 Hrs  T(F): 98.3 (19 Oct 2024 05:06), Max: 98.3 (19 Oct 2024 05:06)  HR: 76 (19 Oct 2024 05:06) (76 - 83)  BP: 109/68 (19 Oct 2024 05:53) (96/62 - 130/80)  RR: 17 (19 Oct 2024 05:06) (17 - 18)  SpO2: 96% (19 Oct 2024 05:06) (91% - 98%)  I&O's Summary      PHYSICAL EXAM:  General: awake   Neck: supple   Lungs: CTA   Cardio: RRR, S1/S2, No murmur  Abdomen: Soft, Nontender, Nondistended; Bowel sounds present  Extremities: No calf tenderness, No pitting edema    LABS:                        13.9   7.94  )-----------( 192      ( 19 Oct 2024 04:46 )             40.6     10-19    140  |  104  |  16.7  ----------------------------<  118  4.0   |  26.0  |  0.73    Ca    8.7      19 Oct 2024 04:46    TPro  6.5  /  Alb  4.0  /  TBili  0.7  /  DBili  x   /  AST  27  /  ALT  33  /  AlkPhos  91  10-18                    10-18 Chol 161 mg/dL LDL -- HDL 43 mg/dL Trig 109 mg/dL  TSH 0.75   TSH with FT4 reflex --  Total T3 --                  Urinalysis Basic - ( 19 Oct 2024 04:46 )    Color: x / Appearance: x / SG: x / pH: x  Gluc: 118 mg/dL / Ketone: x  / Bili: x / Urobili: x   Blood: x / Protein: x / Nitrite: x   Leuk Esterase: x / RBC: x / WBC x   Sq Epi: x / Non Sq Epi: x / Bacteria: x          RADIOLOGY & ADDITIONAL TESTS:

## 2024-10-19 NOTE — PROGRESS NOTE ADULT - PROBLEM SELECTOR PLAN 1
- EKG without acute changes  - Trops neg x2  - TSH normal  - Orthostatics negative  - Head CT unremarkable  - TTE reveals EF 50-55% with hypokinesis of the mid inferolateral segment.   - Monitor Tele for acute arrhythmia monitoring  - Monitor CBC, BMP   - Check Lyme panel  - Recommend CT with CAC to assess for CAD   - Recommend exercise nuclear stress test to assess for ischemia due to abnormal TTE. Plan for 10/21. Keep NPO after midnight the night prior to procedure. Pt states he can walk on a treadmill.   - Recommend 30 Day MCOT as outpatient - EKG without acute changes  - Trops neg x2  - TSH normal  - Orthostatics negative  - Head CT unremarkable  - TTE reveals EF 50-55% with hypokinesis of the mid inferolateral segment.   - Monitor Tele for acute arrhythmia monitoring  - Monitor CBC, BMP   - Check Lyme panel  - Recommend CT with CAC to assess for CAD .  - Recommend exercise nuclear stress test to assess for ischemia due to abnormal TTE. Plan for 10/21. Keep NPO after midnight the night prior to procedure. Pt states he can walk on a treadmill.   - Recommend 30 Day MCOT as outpatient

## 2024-10-19 NOTE — PROGRESS NOTE ADULT - ASSESSMENT
73 y/o male with PMH of HTN, HLD, gout came to the ED s/p syncope. In the ED, CT head: no acute intracranial finding.     1-Syncope a/w abdominal pain and sweats   High BP on presentation   cont tele   TTE result reviewed focal wall motion abnormalite   Cardiology follow up re futher work up   Troponin x 2 done (32--->36)     2- h/o HTN   BP si soft this am   will get orthostatic BP   change valsartan to lower dose   Atorvastatin 80mg   Monitor BP     3-Pre-DM-2   HbA1C: 6.0  Lifestyle modification   low carb diet rec     4-Gout  Allopurinol 300mg

## 2024-10-19 NOTE — PROGRESS NOTE ADULT - PROBLEM SELECTOR PLAN 3
continue statin      Discussed with Dr. Dupree. Assessment and recommendations are final when note is signed by the attending.

## 2024-10-20 PROCEDURE — 99232 SBSQ HOSP IP/OBS MODERATE 35: CPT | Mod: 25

## 2024-10-20 PROCEDURE — 99232 SBSQ HOSP IP/OBS MODERATE 35: CPT

## 2024-10-20 RX ADMIN — Medication 81 MILLIGRAM(S): at 09:57

## 2024-10-20 RX ADMIN — Medication 80 MILLIGRAM(S): at 21:32

## 2024-10-20 RX ADMIN — Medication 300 MILLIGRAM(S): at 09:57

## 2024-10-20 RX ADMIN — VALSARTAN 80 MILLIGRAM(S): 160 TABLET ORAL at 05:55

## 2024-10-20 RX ADMIN — Medication 40 MILLIGRAM(S): at 21:32

## 2024-10-20 NOTE — PROGRESS NOTE ADULT - ASSESSMENT
75 y/o male with PMH of HTN, HLD, gout came to the ED s/p syncope. In the ED, CT head: no acute intracranial finding.     1-Syncope a/w abdominal pain and sweats   High BP on presentation   cont tele   TTE result reviewed focal wall motion abnormalite   Cardiology follow up re futher work up   Troponin x 2 done (32--->36)     2- h/o HTN   BP si soft this am   will get orthostatic BP   change valsartan to lower dose   Atorvastatin 80mg   Monitor BP     3-Pre-DM-2   HbA1C: 6.0  Lifestyle modification   low carb diet rec     4-Gout  Allopurinol 300mg    73 y/o male with PMH of HTN, HLD, gout came to the ED s/p syncope. In the ED, CT head: no acute intracranial finding.     1-Syncope a/w abdominal pain and sweats   High BP on presentation   TTE result reviewed focal wall motion abnormalites ;  Cardiology follow up noted NST or CTA of coronaries in am   Troponin x 2 done (32--->36)     2- h/o HTN   no orthostatic hypotension   change valsartan to lower dose   Atorvastatin 80mg     3-Pre-Diabetes   HbA1C: 6.0  Lifestyle modification   low carb diet rec     4-Gout  Allopurinol 300mg     dvt prophlaxis

## 2024-10-20 NOTE — PROGRESS NOTE ADULT - SUBJECTIVE AND OBJECTIVE BOX
Patient is a 74y old  Male with syncope       chart reviewed '    ALLERGIES:  No Known Allergies    MEDICATIONS  (STANDING):  allopurinol 300 milliGRAM(s) Oral daily  atorvastatin 80 milliGRAM(s) Oral at bedtime  hydrochlorothiazide 12.5 milliGRAM(s) Oral daily  influenza  Vaccine (HIGH DOSE) 0.5 milliLiter(s) IntraMuscular once    MEDICATIONS  (PRN):  acetaminophen     Tablet .. 650 milliGRAM(s) Oral every 6 hours PRN Temp greater or equal to 38C (100.4F), Mild Pain (1 - 3)  aluminum hydroxide/magnesium hydroxide/simethicone Suspension 30 milliLiter(s) Oral every 4 hours PRN Dyspepsia  melatonin 3 milliGRAM(s) Oral at bedtime PRN Insomnia  ondansetron Injectable 4 milliGRAM(s) IV Push every 8 hours PRN Nausea and/or Vomiting    Vital Signs Last 24 Hrs  T(C): 36.5 (20 Oct 2024 04:00), Max: 36.6 (19 Oct 2024 11:51)  T(F): 97.7 (20 Oct 2024 04:00), Max: 97.9 (19 Oct 2024 11:51)  HR: 75 (20 Oct 2024 04:00) (65 - 75)  BP: 106/65 (20 Oct 2024 04:00) (102/63 - 118/71)  BP(mean): 85 (19 Oct 2024 22:10) (85 - 85)  RR: 18 (20 Oct 2024 04:00) (18 - 18)  SpO2: 98% (20 Oct 2024 04:00) (94% - 98%)    Parameters below as of 20 Oct 2024 04:00  Patient On (Oxygen Delivery Method): room air        PHYSICAL EXAM:  General: awake   Neck: supple   Lungs: CTA   Cardio: RRR, S1/S2, No murmur  Abdomen: Soft, Nontender, Nondistended; Bowel sounds present  Extremities: No calf tenderness, No pitting edema    LABS:                        13.9   7.94  )-----------( 192      ( 19 Oct 2024 04:46 )             40.6     10-19    140  |  104  |  16.7  ----------------------------<  118  4.0   |  26.0  |  0.73    Ca    8.7      19 Oct 2024 04:46    TPro  6.5  /  Alb  4.0  /  TBili  0.7  /  DBili  x   /  AST  27  /  ALT  33  /  AlkPhos  91  10-18                    10-18 Chol 161 mg/dL LDL -- HDL 43 mg/dL Trig 109 mg/dL  TSH 0.75   TSH with FT4 reflex --  Total T3 --                  Urinalysis Basic - ( 19 Oct 2024 04:46 )    Color: x / Appearance: x / SG: x / pH: x  Gluc: 118 mg/dL / Ketone: x  / Bili: x / Urobili: x   Blood: x / Protein: x / Nitrite: x   Leuk Esterase: x / RBC: x / WBC x   Sq Epi: x / Non Sq Epi: x / Bacteria: x          RADIOLOGY & ADDITIONAL TESTS:       Patient is a 74y old  Male with syncope       chart reviewed   pt is seen no complaints   no SOB , no CP     cardiology follow up rec noted         ALLERGIES:  No Known Allergies    MEDICATIONS  (STANDING):  allopurinol 300 milliGRAM(s) Oral daily  atorvastatin 80 milliGRAM(s) Oral at bedtime  hydrochlorothiazide 12.5 milliGRAM(s) Oral daily  influenza  Vaccine (HIGH DOSE) 0.5 milliLiter(s) IntraMuscular once    MEDICATIONS  (PRN):  acetaminophen     Tablet .. 650 milliGRAM(s) Oral every 6 hours PRN Temp greater or equal to 38C (100.4F), Mild Pain (1 - 3)  aluminum hydroxide/magnesium hydroxide/simethicone Suspension 30 milliLiter(s) Oral every 4 hours PRN Dyspepsia  melatonin 3 milliGRAM(s) Oral at bedtime PRN Insomnia  ondansetron Injectable 4 milliGRAM(s) IV Push every 8 hours PRN Nausea and/or Vomiting      Vital Signs Last 24 Hrs  T(C): 36.4 (20 Oct 2024 08:12), Max: 36.6 (19 Oct 2024 11:51)  T(F): 97.6 (20 Oct 2024 08:12), Max: 97.9 (19 Oct 2024 11:51)  HR: 77 (20 Oct 2024 08:12) (65 - 77)  BP: 113/71 (20 Oct 2024 08:12) (102/63 - 118/71)  BP(mean): 85 (19 Oct 2024 22:10) (85 - 85)  RR: 18 (20 Oct 2024 08:12) (18 - 18)  SpO2: 97% (20 Oct 2024 08:12) (94% - 98%)    Parameters below as of 20 Oct 2024 08:12  Patient On (Oxygen Delivery Method): room air      General: awake , alert   Lungs: CTA bilateral    Cardio: RRR, S1/S2, No murmur  Abdomen: Soft, Nontender, Nondistended; Bowel sounds present  Extremities: No calf tenderness, No pitting edema  Skin : normal color                           13.9   7.94  )-----------( 192      ( 19 Oct 2024 04:46 )             40.6       10-19    140  |  104  |  16.7  ----------------------------<  118[H]  4.0   |  26.0  |  0.73    Ca    8.7      19 Oct 2024 04:46    TPro  6.5[L]  /  Alb  4.0  /  TBili  0.7  /  DBili  x   /  AST  27  /  ALT  33  /  AlkPhos  91  10-18              10-18 Chol 161 mg/dL LDL -- HDL 43 mg/dL Trig 109 mg/dL  TSH 0.75   TSH with FT4 reflex --  Total T3 --

## 2024-10-20 NOTE — PROGRESS NOTE ADULT - SUBJECTIVE AND OBJECTIVE BOX
[facility]                                                         CARDIOLOGY AT [facility]                                                                  [address], [place] [address]                                                         Telephone: [phone]. Fax:[phone]                                                                             PROGRESS NOTE    Reason for follow up: syncope/wall motion abnormality on echo  Update: Feeling well Denies any complaints  Awaiting NST and Calcium score      Review of symptoms:   Cardiac:  No chest pain. No dyspnea. No palpitations.  Respiratory: no cough. No dyspnea  Gastrointestinal: No diarrhea. No abdominal pain. No bleeding.   Neuro: No focal neuro complaints.      Vitals:  T(C): 36.4 (10-20-24 @ 08:12), Max: 36.6 (10-19-24 @ 11:51)  HR: 77 (10-20-24 @ 08:12) (65 - 77)  BP: 113/71 (10-20-24 @ 08:12) (102/63 - 118/71)  RR: 18 (10-20-24 @ 08:12) (18 - 18)  SpO2: 97% (10-20-24 @ 08:12) (94% - 98%)  Wt(kg): --  I&O's Summary    19 Oct 2024 07:01  -  20 Oct 2024 07:00  --------------------------------------------------------  IN: 240 mL / OUT: 0 mL / NET: 240 mL      Weight (kg): 82.6 (10-18 @ 13:24)      PHYSICAL EXAM:  Appearance: Comfortable. No acute distress  HEENT:  Atraumatic. Normocephalic.  Normal oral mucosa  Neurologic: A & O x 3, no gross focal deficits.  Cardiovascular: RRR S1 S2, No murmur, no rubs/gallops. No JVD  Respiratory: Lungs clear to auscultation, unlabored   Gastrointestinal:  Soft, Non-tender, + BS  Lower Extremities: No edema  Psychiatry: Patient is calm. No agitation.   Skin: warm and dry.      CURRENT MEDICATIONS:  MEDICATIONS  (STANDING):  allopurinol 300 milliGRAM(s) Oral daily  aspirin enteric coated 81 milliGRAM(s) Oral daily  atorvastatin 80 milliGRAM(s) Oral at bedtime  enoxaparin Injectable 40 milliGRAM(s) SubCutaneous every 24 hours  hydrochlorothiazide 12.5 milliGRAM(s) Oral daily  influenza  Vaccine (HIGH DOSE) 0.5 milliLiter(s) IntraMuscular once  valsartan 80 milliGRAM(s) Oral daily    ABS:	 	                            13.9   7.94  )-----------( 192      ( 19 Oct 2024 04:46 )             40.6     10-19    140  |  104  |  16.7  ----------------------------<  118[H]  4.0   |  26.0  |  0.73    Ca    8.7      19 Oct 2024 04:46    TPro  6.5[L]  /  Alb  4.0  /  TBili  0.7  /  DBili  x   /  AST  27  /  ALT  33  /  AlkPhos  91  10-18    proBNP:   Lipid Profile: Date: 10-18 @ 14:51  Total cholesterol 161; Direct LDL: --; HDL: 43; Triglycerides:109    HgA1c:   TSH: Thyroid Stimulating Hormone, Serum: 0.75 uIU/mL  Thyroid Stimulating Hormone, Serum: 0.94 uIU/mL  TELEMETRY: Sinus 63    DIAGNOSTIC TESTING:  [ ] Echocardiogram: < from: TTE W or WO Ultrasound Enhancing Agent (10.18.24 @ 19:26) >      1. Left ventricular wall thickness is mildly increased. Left ventricular systolic function is low normal with an ejection fraction of 53 % by Osullivan's method of disks with an ejection fraction visually estimated at 50 to 55 %.   2. Mid inferolateral segment is abnormal.   3. Normal right ventricular cavity size and normal right ventricular systolic function.   4. Normal left and right atrial size.   5. There is focal calcification of the aortic valve leaflets.   6. There is calcification along the mitral-aortic intervalvular fibrosa.   7. There is moderate calcification of the mitral valve annulus.   8. Pulmonary artery systolic pressure could not be estimated.   9. No pericardial effusion seen.  10. No prior echocardiogram is available for comparison.

## 2024-10-20 NOTE — PROGRESS NOTE ADULT - PROBLEM SELECTOR PLAN 1
Telemetry no arrhythmias  Check orthostatics again  Ekg negative for ischemia  Feels well no further symptoms  Echo EF 55% with inf wall abnormalities  will plan for NST and Calcium score for tomorrow  NPO after 12 am

## 2024-10-20 NOTE — PROGRESS NOTE ADULT - ASSESSMENT
This is a 74-year-old-year-old male with PMH of HTN, HLD, gout, asthma, ex-smoker presents to the ED c/o syncope.  Patient was BIBEMS with initially elevated HR to 150.  Patient returned to normal sinus rhythm prior to arrival in ED.  Patient states he was eating lunch when he experienced sudden onset dizziness, vision changes and profuse sweating.  Patient states the next memory he has is waking up seated on a chair in a different location. Per patient, his colleague told him he had lost consciousness twice and was down for approximately 30 seconds during each episode.  Per patient report, there was no witnessed seizure-like activity and no tongue biting. Patient endorsing no symptoms at this time. Patient denies SOB, chest pain, fever, chills, nausea, vomiting, diarrhea, constipation, headache, weakness, dizziness, dysuria, hematuria wre negative  echo EF 55% with inf wall abnormlaity

## 2024-10-21 ENCOUNTER — RESULT REVIEW (OUTPATIENT)
Age: 74
End: 2024-10-21

## 2024-10-21 ENCOUNTER — TRANSCRIPTION ENCOUNTER (OUTPATIENT)
Age: 74
End: 2024-10-21

## 2024-10-21 PROCEDURE — 93016 CV STRESS TEST SUPVJ ONLY: CPT

## 2024-10-21 PROCEDURE — 93880 EXTRACRANIAL BILAT STUDY: CPT | Mod: 26

## 2024-10-21 PROCEDURE — 78452 HT MUSCLE IMAGE SPECT MULT: CPT | Mod: 26

## 2024-10-21 PROCEDURE — 75571 CT HRT W/O DYE W/CA TEST: CPT | Mod: 26

## 2024-10-21 PROCEDURE — 93018 CV STRESS TEST I&R ONLY: CPT

## 2024-10-21 PROCEDURE — 99232 SBSQ HOSP IP/OBS MODERATE 35: CPT | Mod: 25

## 2024-10-21 PROCEDURE — 99232 SBSQ HOSP IP/OBS MODERATE 35: CPT

## 2024-10-21 RX ORDER — ASPIRIN/MAG CARB/ALUMINUM AMIN 325 MG
1 TABLET ORAL
Qty: 0 | Refills: 0 | DISCHARGE
Start: 2024-10-21

## 2024-10-21 RX ORDER — SODIUM CHLORIDE 9 MG/ML
1000 INJECTION, SOLUTION INTRAMUSCULAR; INTRAVENOUS; SUBCUTANEOUS
Refills: 0 | Status: DISCONTINUED | OUTPATIENT
Start: 2024-10-21 | End: 2024-10-23

## 2024-10-21 RX ADMIN — Medication 80 MILLIGRAM(S): at 21:48

## 2024-10-21 RX ADMIN — Medication 40 MILLIGRAM(S): at 21:48

## 2024-10-21 RX ADMIN — SODIUM CHLORIDE 70 MILLILITER(S): 9 INJECTION, SOLUTION INTRAMUSCULAR; INTRAVENOUS; SUBCUTANEOUS at 14:35

## 2024-10-21 RX ADMIN — Medication 300 MILLIGRAM(S): at 14:35

## 2024-10-21 RX ADMIN — VALSARTAN 80 MILLIGRAM(S): 160 TABLET ORAL at 14:35

## 2024-10-21 RX ADMIN — Medication 81 MILLIGRAM(S): at 14:34

## 2024-10-21 NOTE — DISCHARGE NOTE PROVIDER - NSDCCPCAREPLAN_GEN_ALL_CORE_FT
PRINCIPAL DISCHARGE DIAGNOSIS  Diagnosis: Syncope  Assessment and Plan of Treatment: no recurrent events      SECONDARY DISCHARGE DIAGNOSES  Diagnosis: Hypertension  Assessment and Plan of Treatment:     Diagnosis: Systolic dysfunction  Assessment and Plan of Treatment:      PRINCIPAL DISCHARGE DIAGNOSIS  Diagnosis: Syncope  Assessment and Plan of Treatment: see cardiologist for event monitor arranged to r/o artythmia      SECONDARY DISCHARGE DIAGNOSES  Diagnosis: Multiple vessel coronary artery disease  Assessment and Plan of Treatment: conitine aspirin , plavix , statin , follow up with cardiiologist in few weeks    Diagnosis: Hypertension  Assessment and Plan of Treatment: controlled

## 2024-10-21 NOTE — DISCHARGE NOTE PROVIDER - CARE PROVIDERS DIRECT ADDRESSES
[email or web address]

## 2024-10-21 NOTE — PROGRESS NOTE ADULT - SUBJECTIVE AND OBJECTIVE BOX
Patient is a 74y old  Male with syncope , mild CM       chart reviewed , pt is seen examined in am   resting in the bed   no complaints , no CP , no dizziness     Cardiac follow up noted . CTA heart result P         ALLERGIES:  No Known Allergies      MEDICATIONS  (STANDING):  allopurinol 300 milliGRAM(s) Oral daily  aspirin enteric coated 81 milliGRAM(s) Oral daily  atorvastatin 80 milliGRAM(s) Oral at bedtime  enoxaparin Injectable 40 milliGRAM(s) SubCutaneous every 24 hours  hydrochlorothiazide 12.5 milliGRAM(s) Oral daily  influenza  Vaccine (HIGH DOSE) 0.5 milliLiter(s) IntraMuscular once  valsartan 80 milliGRAM(s) Oral daily    Vital Signs Last 24 Hrs  T(C): 36.3 (21 Oct 2024 09:23), Max: 36.8 (20 Oct 2024 21:23)  T(F): 97.3 (21 Oct 2024 09:23), Max: 98.3 (20 Oct 2024 21:23)  HR: 68 (21 Oct 2024 09:23) (67 - 90)  BP: 135/79 (21 Oct 2024 09:23) (113/65 - 153/84)  BP(mean): --  RR: 18 (21 Oct 2024 09:23) (18 - 18)  SpO2: 98% (21 Oct 2024 09:23) (97% - 99%)    Parameters below as of 21 Oct 2024 09:23  Patient On (Oxygen Delivery Method): room air          General: awake , alert   Lungs: CTA bilateral    Cardio: RRR, S1/S2, No murmur  Abdomen: Soft, Nontender, Nondistended; Bowel sounds present  Extremities: No calf tenderness, No pitting edema  Skin : normal color                           13.9   7.94  )-----------( 192      ( 19 Oct 2024 04:46 )             40.6       10-19    140  |  104  |  16.7  ----------------------------<  118[H]  4.0   |  26.0  |  0.73    Ca    8.7      19 Oct 2024 04:46    TPro  6.5[L]  /  Alb  4.0  /  TBili  0.7  /  DBili  x   /  AST  27  /  ALT  33  /  AlkPhos  91  10-18

## 2024-10-21 NOTE — PROGRESS NOTE ADULT - ASSESSMENT
74-year-old-year-old male with PMH of HTN, HLD, gout, asthma, ex-smoker presents to the ED c/o syncope.  Patient was BIBEMS with initially elevated HR to 150.  Patient returned to normal sinus rhythm prior to arrival in ED.  Patient states he was eating lunch when he experienced sudden onset dizziness, vision changes and profuse sweating.  Patient states the next memory he has is waking up seated on a chair in a different location. Per patient, his colleague told him he had lost consciousness twice and was down for approximately 30 seconds during each episode.  Per patient report, there was no witnessed seizure-like activity and no tongue biting. Patient endorsing no symptoms at this time. Echo EF 55% with inf wall abnormlaity

## 2024-10-21 NOTE — DISCHARGE NOTE PROVIDER - HOSPITAL COURSE
74-year-old-year-old male with PMH of HTN, HLD, gout, asthma, ex-smoker presents to the ED c/o syncope.  Patient was BIBEMS with initially elevated HR to 150.  Patient returned to normal sinus rhythm prior to arrival in ED.  Patient states he was eating lunch when he experienced sudden onset dizziness, vision changes and profuse sweating.  Patient states the next memory he has is waking up seated on a chair in a different location. Per patient, his colleague told him he had lost consciousness twice and was down for approximately 30 seconds during each episode.  Per patient report, there was no witnessed seizure-like activity and no tongue biting. Patient endorsing no symptoms at this time. Echo EF 55% with inf wall abnormality . cardiology consulted admitted to tele   Pt had NST mild ischemia , CTA of coronaries   no events on tele   74-year-old-year-old male with PMH of HTN, HLD, gout, asthma, ex-smoker presents to the ED c/o syncope.  Patient was BIBEMS with initially elevated HR to 150.  Patient returned to normal sinus rhythm prior to arrival in ED.  Patient states he was eating lunch when he experienced sudden onset dizziness, vision changes and profuse sweating.  Patient states the next memory he has is waking up seated on a chair in a different location. Per patient, his colleague told him he had lost consciousness twice and was down for approximately 30 seconds during each episode.  Per patient report, there was no witnessed seizure-like activity and no tongue biting. Patient endorsing no symptoms at this time. Echo EF 55% with inf wall abnormality . cardiology consulted admitted to tele   Pt had NST mild ischemia , [facility] performed multivessel  CAD ischemia on NST , added plavix cont B blocker and statin medical treatment   no events on tele, pt is to get MOCT  event monitor on DC

## 2024-10-21 NOTE — PROGRESS NOTE ADULT - ASSESSMENT
73 y/o male with PMH of HTN, HLD, gout came to the ED s/p syncope. In the ED, CT head: no acute intracranial finding.     1-Syncope   BP is controlled ,. hypertensive on field per admission   for CTA of heart ca score is Pending   may neeed STress test or LHC pending result     TTE result reviewed focal wall motion abnormalites ;  cont asa   statin     2-  HTN goal < 130   controlled   cont valsartan       3-Pre-Diabetes   HbA1C: 6.0  Lifestyle modification   low carb diet rec     4-Gout  Allopurinol 300mg     dvt prophlaxis lovenox     Dc planing home in 24 hours pendinf CTA result

## 2024-10-21 NOTE — DISCHARGE NOTE PROVIDER - CARE PROVIDER_API CALL
Miguel Ángel Dupree  Cardiovascular Disease  [address] [place] [address]  Phone: [phone]  Fax: [phone]  Follow Up Time:    Dayo Sorto  Cardiology  [address] [facility], [address] [address]  Phone: [phone]  Fax: [phone]  Follow Up Time: 2 weeks

## 2024-10-21 NOTE — PROGRESS NOTE ADULT - PROBLEM SELECTOR PLAN 1
-LOC x2 occurred while eating lunch  -TTE noted with WMA and preserved EF  -Trop x3 < 50  -Orthostatic negative  -EKG without acute ischemia   -CTH negative    Down at CT calcium score, pending Exercise NST  IF both test are benign, outpatient follow up with [facility] monitor  Has no established cardiologist

## 2024-10-21 NOTE — PROGRESS NOTE ADULT - SUBJECTIVE AND OBJECTIVE BOX
[facility]                                                         CARDIOLOGY AT [facility]                                                                  [address] [facility], [place] [address]                                                         Telephone: [phone]. Fax:[phone]                                                                             PROGRESS NOTE    Reason for follow up: syncope, WMA  Update: pending NST / calcium score       Review of symptoms:   Cardiac:  No chest pain. No dyspnea. No palpitations.  Respiratory: no cough. No dyspnea  Gastrointestinal: No diarrhea. No abdominal pain. No bleeding.   Neuro: No focal neuro complaints.    Vitals:  T(C): 36.3 (10-21-24 @ 09:23), Max: 36.8 (10-20-24 @ 21:23)  HR: 68 (10-21-24 @ 09:23) (67 - 90)  BP: 135/79 (10-21-24 @ 09:23) (113/65 - 153/84)  RR: 18 (10-21-24 @ 09:23) (18 - 18)  SpO2: 98% (10-21-24 @ 09:23) (97% - 99%)  Wt(kg): --  I&O's Summary    20 Oct 2024 07:01  -  21 Oct 2024 07:00  --------------------------------------------------------  IN: 900 mL / OUT: 0 mL / NET: 900 mL      Weight (kg): 82.6 (10-18 @ 13:24)    PHYSICAL EXAM:  Appearance: Comfortable. No acute distress  HEENT:  Atraumatic. Normocephalic.  Normal oral mucosa  Neurologic: A & O x 3, no gross focal deficits.  Cardiovascular: RRR S1 S2, No murmur, no rubs/gallops. No JVD  Respiratory: Lungs clear to auscultation, unlabored   Gastrointestinal:  Soft, Non-tender, + BS  Lower Extremities: 2+ Peripheral Pulses, No clubbing, cyanosis, or edema  Psychiatry: Patient is calm. No agitation.   Skin: warm and dry.    CURRENT CARDIAC MEDICATIONS:  hydrochlorothiazide 12.5 milliGRAM(s) Oral daily  valsartan 80 milliGRAM(s) Oral daily      CURRENT OTHER MEDICATIONS:  acetaminophen     Tablet .. 650 milliGRAM(s) Oral every 6 hours PRN Temp greater or equal to 38C (100.4F), Mild Pain (1 - 3)  melatonin 3 milliGRAM(s) Oral at bedtime PRN Insomnia  ondansetron Injectable 4 milliGRAM(s) IV Push every 8 hours PRN Nausea and/or Vomiting  aluminum hydroxide/magnesium hydroxide/simethicone Suspension 30 milliLiter(s) Oral every 4 hours PRN Dyspepsia  allopurinol 300 milliGRAM(s) Oral daily  atorvastatin 80 milliGRAM(s) Oral at bedtime  aspirin enteric coated 81 milliGRAM(s) Oral daily  enoxaparin Injectable 40 milliGRAM(s) SubCutaneous every 24 hours  influenza  Vaccine (HIGH DOSE) 0.5 milliLiter(s) IntraMuscular once      LABS:	 	                Lipid Profile: Date: 10-18 @ 14:51  Total cholesterol 161; Direct LDL: --; HDL: 43; Triglycerides:109    HgA1c:   TSH: Thyroid Stimulating Hormone, Serum: 0.75 uIU/mL  Thyroid Stimulating Hormone, Serum: 0.94 uIU/mL      TELEMETRY: SR      DIAGNOSTIC TESTING:  [ ] Echocardiogram:   < from: TTE W or WO Ultrasound Enhancing Agent (10.18.24 @ 19:26) >  CONCLUSIONS:      1. Left ventricular wall thickness is mildly increased. Left ventricular systolic function is low normal with an ejection fraction of 53 % by Osullivan's method of disks with an ejection fraction visually estimated at 50 to 55 %.   2. Mid inferolateral segment is abnormal.   3. Normal right ventricular cavity size and normal right ventricular systolic function.   4. Normal left and right atrial size.   5. There is focal calcification of the aortic valve leaflets.   6. There is calcification along the mitral-aortic intervalvular fibrosa.   7. There is moderate calcification of the mitral valve annulus.   8. Pulmonary artery systolic pressure could not be estimated.   9. No pericardial effusion seen.  10. No prior echocardiogram is available for comparison.    < end of copied text >

## 2024-10-21 NOTE — DISCHARGE NOTE PROVIDER - NSDCMRMEDTOKEN_GEN_ALL_CORE_FT
allopurinol 300 mg oral tablet: 1 tab(s) orally once a day  aspirin 81 mg oral delayed release tablet: 1 tab(s) orally once a day  atorvastatin 80 mg oral tablet: 1 tab(s) orally once a day (at bedtime)  valsartan-hydrochlorothiazide 160 mg-12.5 mg oral tablet: 1 tab(s) orally once a day   allopurinol 300 mg oral tablet: 1 tab(s) orally once a day  aspirin 81 mg oral delayed release tablet: 1 tab(s) orally once a day  atorvastatin 80 mg oral tablet: 1 tab(s) orally once a day (at bedtime)  clopidogrel 75 mg oral tablet: 1 tab(s) orally once a day  metoprolol succinate 25 mg oral tablet, extended release: 1 tab(s) orally once a day  valsartan-hydrochlorothiazide 160 mg-12.5 mg oral tablet: 1 tab(s) orally once a day

## 2024-10-21 NOTE — DISCHARGE NOTE PROVIDER - ATTENDING DISCHARGE PHYSICAL EXAMINATION:
pt is seen this am 10/23   he is feeling well , LHC with MVD mild ischemia on stress test   no intervention needed   add plavix ,asa , metoprolol  cont BP meds   for MOCT on DC   Vital Signs Last 24 Hrs  T(C): 36.3 (23 Oct 2024 08:45), Max: 36.5 (22 Oct 2024 14:47)  T(F): 97.3 (23 Oct 2024 08:45), Max: 97.7 (22 Oct 2024 14:47)  HR: 60 (23 Oct 2024 08:45) (58 - 69)  BP: 120/73 (23 Oct 2024 08:45) (94/56 - 140/81)  BP(mean): 90 (23 Oct 2024 05:44) (78 - 90)  RR: 18 (23 Oct 2024 08:45) (9 - 20)  SpO2: 97% (23 Oct 2024 08:45) (96% - 99%)    Parameters below as of 23 Oct 2024 08:45  Patient On (Oxygen Delivery Method): room air    Lungs CTA   Heart : regular s1 /s2   Abd soft no tenderness , BS +   ext No edema

## 2024-10-22 DIAGNOSIS — I25.10 ATHEROSCLEROTIC HEART DISEASE OF NATIVE CORONARY ARTERY W/OUT ANGINA PECTORIS: ICD-10-CM

## 2024-10-22 DIAGNOSIS — R55 SYNCOPE AND COLLAPSE: ICD-10-CM

## 2024-10-22 DIAGNOSIS — R94.39 ABNORMAL RESULT OF OTHER CARDIOVASCULAR FUNCTION STUDY: ICD-10-CM

## 2024-10-22 PROCEDURE — 99232 SBSQ HOSP IP/OBS MODERATE 35: CPT

## 2024-10-22 PROCEDURE — 93458 L HRT ARTERY/VENTRICLE ANGIO: CPT | Mod: 26

## 2024-10-22 RX ORDER — SODIUM CHLORIDE 9 MG/ML
250 INJECTION, SOLUTION INTRAMUSCULAR; INTRAVENOUS; SUBCUTANEOUS ONCE
Refills: 0 | Status: COMPLETED | OUTPATIENT
Start: 2024-10-22 | End: 2024-10-22

## 2024-10-22 RX ORDER — TICAGRELOR 90 MG/1
90 TABLET ORAL EVERY 12 HOURS
Refills: 0 | Status: DISCONTINUED | OUTPATIENT
Start: 2024-10-23 | End: 2024-10-23

## 2024-10-22 RX ORDER — METOPROLOL TARTRATE 50 MG
25 TABLET ORAL DAILY
Refills: 0 | Status: DISCONTINUED | OUTPATIENT
Start: 2024-10-22 | End: 2024-10-23

## 2024-10-22 RX ADMIN — Medication 81 MILLIGRAM(S): at 11:58

## 2024-10-22 RX ADMIN — SODIUM CHLORIDE 250 MILLILITER(S): 9 INJECTION, SOLUTION INTRAMUSCULAR; INTRAVENOUS; SUBCUTANEOUS at 13:26

## 2024-10-22 RX ADMIN — SODIUM CHLORIDE 500 MILLILITER(S): 9 INJECTION, SOLUTION INTRAMUSCULAR; INTRAVENOUS; SUBCUTANEOUS at 13:26

## 2024-10-22 RX ADMIN — Medication 300 MILLIGRAM(S): at 15:53

## 2024-10-22 RX ADMIN — Medication 80 MILLIGRAM(S): at 22:29

## 2024-10-22 RX ADMIN — Medication 40 MILLIGRAM(S): at 22:29

## 2024-10-22 RX ADMIN — VALSARTAN 80 MILLIGRAM(S): 160 TABLET ORAL at 05:20

## 2024-10-22 NOTE — PROGRESS NOTE ADULT - SUBJECTIVE AND OBJECTIVE BOX
[facility]                                                         CARDIOLOGY AT [place] [address]                                                         Telephone: [phone]. Fax:[phone]                                                                             PROGRESS NOTE    Reason for follow up: [facility] today   Update:   Pt seen and examined at bedside. Denies any new complaints. Planned for [facility] today. Has been NPO since midnight.     Review of symptoms:   Cardiac:  No chest pain. No dyspnea. No palpitations.  Respiratory: no cough. No dyspnea  Gastrointestinal: No diarrhea. No abdominal pain. No bleeding.   Neuro: No focal neuro complaints.    Vitals:  T(C): 36.3 (10-22-24 @ 08:47), Max: 36.7 (10-21-24 @ 14:37)  HR: 72 (10-22-24 @ 08:47) (70 - 86)  BP: 124/74 (10-22-24 @ 08:47) (108/68 - 153/83)  RR: 18 (10-22-24 @ 08:47) (18 - 18)  SpO2: 98% (10-22-24 @ 08:47) (95% - 98%)  Wt(kg): --  I&O's Summary    21 Oct 2024 07:01  -  22 Oct 2024 07:00  --------------------------------------------------------  IN: 280 mL / OUT: 0 mL / NET: 280 mL      Weight (kg): 82.6 (10-18 @ 13:24)    PHYSICAL EXAM:  Appearance: Comfortable. No acute distress  HEENT:  Atraumatic. Normocephalic.  Normal oral mucosa  Neurologic: A & O x 3, no gross focal deficits.  Cardiovascular: RRR S1 S2, No murmur, no rubs/gallops. No JVD  Respiratory: Lungs clear to auscultation, unlabored   Gastrointestinal:  Soft, Non-tender, + BS  Lower Extremities: 2+ Peripheral Pulses, No clubbing, cyanosis, or edema  Psychiatry: Patient is calm. No agitation.   Skin: warm and dry.    CURRENT CARDIAC MEDICATIONS:  hydrochlorothiazide 12.5 milliGRAM(s) Oral daily  valsartan 80 milliGRAM(s) Oral daily      CURRENT OTHER MEDICATIONS:  acetaminophen     Tablet .. 650 milliGRAM(s) Oral every 6 hours PRN Temp greater or equal to 38C (100.4F), Mild Pain (1 - 3)  melatonin 3 milliGRAM(s) Oral at bedtime PRN Insomnia  ondansetron Injectable 4 milliGRAM(s) IV Push every 8 hours PRN Nausea and/or Vomiting  aluminum hydroxide/magnesium hydroxide/simethicone Suspension 30 milliLiter(s) Oral every 4 hours PRN Dyspepsia  allopurinol 300 milliGRAM(s) Oral daily  atorvastatin 80 milliGRAM(s) Oral at bedtime  aspirin enteric coated 81 milliGRAM(s) Oral daily  enoxaparin Injectable 40 milliGRAM(s) SubCutaneous every 24 hours  influenza  Vaccine (HIGH DOSE) 0.5 milliLiter(s) IntraMuscular once  sodium chloride 0.9%. 1000 milliLiter(s) (70 mL/Hr) IV Continuous <Continuous>, Stop order after: 6 Hours      LABS:	 	                Lipid Profile: Date: 10-18 @ 14:51  Total cholesterol 161; Direct LDL: --; HDL: 43; Triglycerides:109    HgA1c:   TSH: Thyroid Stimulating Hormone, Serum: 0.75 uIU/mL  Thyroid Stimulating Hormone, Serum: 0.94 uIU/mL      TELEMETRY: SR in 60s, SB in 40s overnight likely while sleeping     DIAGNOSTIC TESTING:  [ ] Echocardiogram: < from: TTE W or WO Ultrasound Enhancing Agent (10.18.24 @ 19:26) >  CONCLUSIONS:      1. Left ventricular wall thickness is mildly increased. Left ventricular systolic function is low normal with an ejection fraction of 53 % by Osullivan's method of disks with an ejection fraction visually estimated at 50 to 55 %.   2. Mid inferolateral segment is abnormal.   3. Normal right ventricular cavity size and normal right ventricular systolic function.   4. Normal left and right atrial size.   5. There is focal calcification of the aortic valve leaflets.   6. There is calcification along the mitral-aortic intervalvular fibrosa.   7. There is moderate calcification of the mitral valve annulus.   8. Pulmonary artery systolic pressure could not be estimated.   9. No pericardial effusion seen.  10. No prior echocardiogram is available for comparison.    < end of copied text >     [ ] Stress Test:     ABNORMAL MILD ISCHEMIC STUDY. Patient achieved 7.00 METS, which is consistent with average exercise capacity. 105% MPHR. No cardiac symptoms. (+) Ischemic ECG changes. De La Cruz treadmill score = 0 (Moderate risk). Perfusion Findings: Mild ischemia in LPL/RPL territory.                                                                   [facility]                                                         CARDIOLOGY AT [place] [address]                                                         Telephone: [phone]. Fax:[phone]                                                                             PROGRESS NOTE    Reason for follow up: [facility] today   Update:   Pt seen and examined at bedside. Denies any new complaints. Planned for [facility] today. Has been NPO since midnight.     Review of symptoms:   Cardiac:  No chest pain. No dyspnea. No palpitations.  Respiratory: no cough. No dyspnea  Gastrointestinal: No diarrhea. No abdominal pain. No bleeding.   Neuro: No focal neuro complaints.    Vitals:  T(C): 36.3 (10-22-24 @ 08:47), Max: 36.7 (10-21-24 @ 14:37)  HR: 72 (10-22-24 @ 08:47) (70 - 86)  BP: 124/74 (10-22-24 @ 08:47) (108/68 - 153/83)  RR: 18 (10-22-24 @ 08:47) (18 - 18)  SpO2: 98% (10-22-24 @ 08:47) (95% - 98%)  Wt(kg): --  I&O's Summary    21 Oct 2024 07:01  -  22 Oct 2024 07:00  --------------------------------------------------------  IN: 280 mL / OUT: 0 mL / NET: 280 mL      Weight (kg): 82.6 (10-18 @ 13:24)    PHYSICAL EXAM:  Appearance: Comfortable. No acute distress  HEENT:  Atraumatic. Normocephalic.  Normal oral mucosa  Neurologic: A & O x 3, no gross focal deficits.  Cardiovascular: RRR S1 S2, No murmur, no rubs/gallops. No JVD  Respiratory: Lungs clear to auscultation, unlabored   Gastrointestinal:  Soft, Non-tender, + BS  Lower Extremities: 2+ Peripheral Pulses, No clubbing, cyanosis, or edema  Psychiatry: Patient is calm. No agitation.   Skin: warm and dry.    CURRENT CARDIAC MEDICATIONS:  hydrochlorothiazide 12.5 milliGRAM(s) Oral daily  valsartan 80 milliGRAM(s) Oral daily      CURRENT OTHER MEDICATIONS:  acetaminophen     Tablet .. 650 milliGRAM(s) Oral every 6 hours PRN Temp greater or equal to 38C (100.4F), Mild Pain (1 - 3)  melatonin 3 milliGRAM(s) Oral at bedtime PRN Insomnia  ondansetron Injectable 4 milliGRAM(s) IV Push every 8 hours PRN Nausea and/or Vomiting  aluminum hydroxide/magnesium hydroxide/simethicone Suspension 30 milliLiter(s) Oral every 4 hours PRN Dyspepsia  allopurinol 300 milliGRAM(s) Oral daily  atorvastatin 80 milliGRAM(s) Oral at bedtime  aspirin enteric coated 81 milliGRAM(s) Oral daily  enoxaparin Injectable 40 milliGRAM(s) SubCutaneous every 24 hours  influenza  Vaccine (HIGH DOSE) 0.5 milliLiter(s) IntraMuscular once  sodium chloride 0.9%. 1000 milliLiter(s) (70 mL/Hr) IV Continuous <Continuous>, Stop order after: 6 Hours      LABS:	 	                Lipid Profile: Date: 10-18 @ 14:51  Total cholesterol 161; Direct LDL: --; HDL: 43; Triglycerides:109      TSH: Thyroid Stimulating Hormone, Serum: 0.75 uIU/mL  Thyroid Stimulating Hormone, Serum: 0.94 uIU/mL      TELEMETRY: SR in 60s, SB in 40s overnight while sleeping , asymptomatic     DIAGNOSTIC TESTING:  [ ] Echocardiogram: < from: TTE W or WO Ultrasound Enhancing Agent (10.18.24 @ 19:26) >  CONCLUSIONS:      1. Left ventricular wall thickness is mildly increased. Left ventricular systolic function is low normal with an ejection fraction of 53 % by Osullivan's method of disks with an ejection fraction visually estimated at 50 to 55 %.   2. Mid inferolateral segment is abnormal.   3. Normal right ventricular cavity size and normal right ventricular systolic function.   4. Normal left and right atrial size.   5. There is focal calcification of the aortic valve leaflets.   6. There is calcification along the mitral-aortic intervalvular fibrosa.   7. There is moderate calcification of the mitral valve annulus.   8. Pulmonary artery systolic pressure could not be estimated.   9. No pericardial effusion seen.  10. No prior echocardiogram is available for comparison.    < end of copied text >     [ ] Stress Test:     ABNORMAL MILD ISCHEMIC STUDY. Patient achieved 7.00 METS, which is consistent with average exercise capacity. 105% MPHR. No cardiac symptoms. (+) Ischemic ECG changes. De La Cruz treadmill score = 0 (Moderate risk). Perfusion Findings: Mild ischemia in LPL/RPL territory.

## 2024-10-22 NOTE — PROGRESS NOTE ADULT - SUBJECTIVE AND OBJECTIVE BOX
Nurse Practitioner Progress note:   s/p LHC by Dr Swain revealing  of RCA with collateral circulation, no intervention.    Medication received during procedure:  Versed: 1 mg  Fentanyl: 25 mg  Heparin: 89371 u  Omnipaque: 52 ml  Brilinta 180 mg    Patient feels well.  Denies chest pain, shortness of breath, dizziness or palpitations at this time    Right radial hemoband in place.  Procedure site CDI, no bleeding, no hematoma, able to move all digits with capillary refill < 3 seconds, fingers warm      T(C): 37 (10-22-24 @ 12:30), Max: 37 (10-22-24 @ 12:30)  HR: 58 (10-22-24 @ 14:00) (58 - 86)  BP: 94/59 (10-22-24 @ 14:00) (94/59 - 153/83)  RR: 9 (10-22-24 @ 14:00) (9 - 20)  SpO2: 98% (10-22-24 @ 14:00) (95% - 99%)      MEDICATIONS  (STANDING):  allopurinol 300 milliGRAM(s) Oral daily  aspirin enteric coated 81 milliGRAM(s) Oral daily  atorvastatin 80 milliGRAM(s) Oral at bedtime  enoxaparin Injectable 40 milliGRAM(s) SubCutaneous every 24 hours  hydrochlorothiazide 12.5 milliGRAM(s) Oral daily  influenza  Vaccine (HIGH DOSE) 0.5 milliLiter(s) IntraMuscular once  sodium chloride 0.9%. 1000 milliLiter(s) (70 mL/Hr) IV Continuous <Continuous>  valsartan 80 milliGRAM(s) Oral daily    MEDICATIONS  (PRN):  acetaminophen     Tablet .. 650 milliGRAM(s) Oral every 6 hours PRN Temp greater or equal to 38C (100.4F), Mild Pain (1 - 3)  aluminum hydroxide/magnesium hydroxide/simethicone Suspension 30 milliLiter(s) Oral every 4 hours PRN Dyspepsia  melatonin 3 milliGRAM(s) Oral at bedtime PRN Insomnia  ondansetron Injectable 4 milliGRAM(s) IV Push every 8 hours PRN Nausea and/or Vomiting        HPI:  Patient seen and examined before midnight.     75 y/o male with PMH of HTN, HLD, gout came to the ED s/p syncope. Patient reported abdominal cramp, followed by diaphoresis, dizziness and vision changes during lunch time. He stepped outside and the next thing he saw was his colleague standing next to him and EMS checking his BP. He was told by his colleague that he passed out twice, he was out for about 30 sec. Patient said the EMS mentioned his BP was "185/145". He has no chest pain, shortness of breath, palpitation, nausea, vomiting, tongue laceration, change/incontinence bowel/urinary, fever, chills, cough, HA, numbness. tingling. No prior similar episode.      (18 Oct 2024 22:15)    now s/p [facility] official report pending        ASSESSMENT/PLAN:    Coronary artery disease  -Recover patient for 3 hours  -Resume cardiac diet  -Ambulate patient p 3 hours  -Discussed therapeutic lidestyle changes to reduce risk factors such as following a cardiac diet, weight loss, maintaining a healthy weight, exercise, smoking cessation, medication compliance, and regular follow-up with MD to know your numbers (BP, cholesterol, weight, and glucole)

## 2024-10-22 NOTE — PROGRESS NOTE ADULT - ASSESSMENT
73 y/o male with PMH of HTN, HLD, gout came to the ED s/p syncope. In the ED, CT head: no acute intracranial finding.     1-Syncope   ischemic work up positive   [facility] today  no events on tele   cont asa   statin     2-  HTN goal < 130   controlled   cont valsartan       3-Pre-Diabetes   HbA1C: 6.0  Lifestyle modification   low carb diet     pending C discharge home soon     4-Gout  Allopurinol 300mg     dvt prophlaxis lovenox     Dc planing home in 24 hours pendinf CTA result    EMS

## 2024-10-22 NOTE — PROGRESS NOTE ADULT - ASSESSMENT
74-year-old-year-old male with PMH of HTN, HLD, gout, asthma, ex-smoker presents to the ED c/o syncope.  Patient was BIBEMS with initially elevated HR to 150.  Patient returned to normal sinus rhythm prior to arrival in ED.  Patient states he was eating lunch when he experienced sudden onset dizziness, vision changes and profuse sweating.  Patient states the next memory he has is waking up seated on a chair in a different location. Per patient, his colleague told him he had lost consciousness twice and was down for approximately 30 seconds during each episode.  Per patient report, there was no witnessed seizure-like activity and no tongue biting. Patient endorsing no symptoms at this time. Echo EF 55% with inf wall abnormlaity 74-year-old-year-old male with PMH of HTN, HLD, gout, asthma, ex-smoker presents to the ED c/o syncope.  Patient was BIBEMS with initially elevated HR to 150.  Patient returned to normal sinus rhythm prior to arrival in ED.  Patient states he was eating lunch when he experienced sudden onset dizziness, vision changes and profuse sweating.  Patient states the next memory he has is waking up seated on a chair in a different location. Per patient, his colleague told him he had lost consciousness twice and was down for approximately 30 seconds during each episode.  Per patient report, there was no witnessed seizure-like activity and no tongue biting. Patient endorsing no symptoms at this time. Echo EF 55% with inf wall abnormality.

## 2024-10-22 NOTE — PROGRESS NOTE ADULT - SUBJECTIVE AND OBJECTIVE BOX
Patient is a 74y old  Male with syncope , mild CM       chart reviewed , pt is seen earlier today   he has no acute complaints   resting     Vital Signs Last 24 Hrs  T(C): 37 (22 Oct 2024 12:30), Max: 37 (22 Oct 2024 12:30)  T(F): 98.6 (22 Oct 2024 12:30), Max: 98.6 (22 Oct 2024 12:30)  HR: 63 (22 Oct 2024 13:30) (60 - 86)  BP: 104/69 (22 Oct 2024 13:30) (98/70 - 153/83)  BP(mean): 85 (22 Oct 2024 04:51) (85 - 106)  RR: 18 (22 Oct 2024 13:30) (14 - 20)  SpO2: 99% (22 Oct 2024 13:30) (95% - 99%)    Parameters below as of 22 Oct 2024 13:30  Patient On (Oxygen Delivery Method): room air          General: awake , alert   Lungs: CTA bilateral    Cardio: RRR, S1/S2, No murmur  Abdomen: Soft, Nontender, Nondistended; Bowel sounds present  Extremities: No calf tenderness, No pitting edema  Skin : normal color

## 2024-10-22 NOTE — PROGRESS NOTE ADULT - PROBLEM SELECTOR PLAN 2
- TTE reveals EF 50-55% with hypokinesis of the mid inferolateral segment.   -Trop x3 < 50  -Orthostatic negative  -EKG without acute ischemia   -CTH negative  - consider MCOT as an outpatient - TTE reveals EF 50-55% with hypokinesis of the mid inferolateral segment.   - Trop x3 < 50  - Orthostatic negative  - EKG without acute ischemia   - CTH negative  - consider MCOT as an outpatient

## 2024-10-22 NOTE — PROGRESS NOTE ADULT - PROBLEM SELECTOR PLAN 1
-Patient achieved 7.00 METS, which is consistent with average exercise capacity. 105% MPHR. No cardiac symptoms. (+) Ischemic ECG changes. De La Cruz treadmill score = 0 (Moderate risk). Perfusion Findings: Mild ischemia in LPL/RPL territory.  -planned for [facility] today - Patient achieved 7.00 METS, which is consistent with average exercise capacity. 105% MPHR. No cardiac symptoms. (+) Ischemic ECG changes. De La Cruz treadmill score = 0 (Moderate risk). Perfusion Findings: Mild ischemia in LPL/RPL territory.  - planned for [facility] today

## 2024-10-23 ENCOUNTER — TRANSCRIPTION ENCOUNTER (OUTPATIENT)
Age: 74
End: 2024-10-23

## 2024-10-23 VITALS
SYSTOLIC BLOOD PRESSURE: 104 MMHG | HEART RATE: 61 BPM | TEMPERATURE: 98 F | RESPIRATION RATE: 18 BRPM | OXYGEN SATURATION: 97 % | DIASTOLIC BLOOD PRESSURE: 65 MMHG

## 2024-10-23 DIAGNOSIS — I25.10 ATHEROSCLEROTIC HEART DISEASE OF NATIVE CORONARY ARTERY WITHOUT ANGINA PECTORIS: ICD-10-CM

## 2024-10-23 PROCEDURE — 99232 SBSQ HOSP IP/OBS MODERATE 35: CPT

## 2024-10-23 PROCEDURE — 99239 HOSP IP/OBS DSCHRG MGMT >30: CPT

## 2024-10-23 RX ORDER — METOPROLOL TARTRATE 50 MG
1 TABLET ORAL
Qty: 30 | Refills: 0
Start: 2024-10-23 | End: 2024-11-21

## 2024-10-23 RX ORDER — CLOPIDOGREL 75 MG/1
75 TABLET ORAL DAILY
Refills: 0 | Status: DISCONTINUED | OUTPATIENT
Start: 2024-10-24 | End: 2024-10-23

## 2024-10-23 RX ORDER — CLOPIDOGREL 75 MG/1
1 TABLET ORAL
Qty: 30 | Refills: 0
Start: 2024-10-23 | End: 2024-11-21

## 2024-10-23 RX ADMIN — Medication 81 MILLIGRAM(S): at 08:47

## 2024-10-23 RX ADMIN — Medication 300 MILLIGRAM(S): at 12:58

## 2024-10-23 RX ADMIN — Medication 25 MILLIGRAM(S): at 05:46

## 2024-10-23 RX ADMIN — TICAGRELOR 90 MILLIGRAM(S): 90 TABLET ORAL at 05:47

## 2024-10-23 RX ADMIN — VALSARTAN 80 MILLIGRAM(S): 160 TABLET ORAL at 05:46

## 2024-10-23 NOTE — PROGRESS NOTE ADULT - ASSESSMENT
74-year-old-year-old male with PMH of HTN, HLD, gout, asthma, ex-smoker presents to the ED c/o syncope.  Patient was BIBEMS with initially elevated HR to 150.  Patient returned to normal sinus rhythm prior to arrival in ED.  Patient states he was eating lunch when he experienced sudden onset dizziness, vision changes and profuse sweating.  Patient states the next memory he has is waking up seated on a chair in a different location. Per patient, his colleague told him he had lost consciousness twice and was down for approximately 30 seconds during each episode.  Per patient report, there was no witnessed seizure-like activity and no tongue biting. Patient endorsing no symptoms at this time. Echo EF 55% with inf wall abnormality.

## 2024-10-23 NOTE — PROGRESS NOTE ADULT - PROBLEM SELECTOR PLAN 1
-Presents with syncope  -Trop x3 < 50  -Preserved EF with WMA  -S/p LHC as above, no intervention  -C/w DAPT, BB, statin, ARB      Has no established cardiologist  To see us in two weeks from discharge   MCOT to be set up for  today at office

## 2024-10-23 NOTE — PROGRESS NOTE ADULT - NS ATTEND AMEND GEN_ALL_CORE FT
Patient presented with recurrent epsiodes of syncope TTE done showed low normal with an ejection fraction of 53 % by Osullivan's method of disks with an ejection fraction visually estimated at 50 to 55 %. Mid inferolateral segment is abnormal  Was referred for CT calcium score and NST - NST shows ABNORMAL MILD ISCHEMIC STUDY. Patient achieved 7.00 METS, which is consistent with average exercise capacity. 105% MPHR. No cardiac symptoms. (+) Ischemic ECG changes. De La Cruz treadmill score = 0 (Moderate risk). Perfusion Findings: Mild ischemia in LPL/RPL territory.    Due to these findings will refer for LHC in the AM     Meena Gomez D.O. [facility]  Cardiology/Vascular Cardiology -[facility]   Telephone # [phone]
Patient seen and examined at bedside with no chest pain or shortness of breath overnight   LHC done shows multivessel CAD - 80% pRCA with mid RCA  (collateralized via the dLAD) mid LCX  collateralized via the LAD and 80% ostial prox D1 with LVEDP 6  TTEL:  1. Left ventricular wall thickness is mildly increased. Left ventricular systolic function is low normal with an ejection fraction of 53 % by Osullivan's method of disks with an ejection fraction visually estimated at 50 to 55 %. Mid inferolateral segment is abnormal.  NST:  ABNORMAL MILD ISCHEMIC STUDY. Patient achieved 7.00 METS, which is consistent with average exercise capacity. 105% MPHR. No cardiac symptoms. (+) Ischemic ECG changes. De La Cruz treadmill score = 0 (Moderate risk). Perfusion Findings: Mild ischemia in LPL/RPL territory.    74-year-old-year-old male with PMH of HTN, HLD, gout, asthma, ex-smoker presents to the ED c/o syncope.  Patient was BIBEMS with initially elevated HR to 150.  Patient returned to normal sinus rhythm prior to arrival in ED.  Patient states he was eating lunch when he experienced sudden onset dizziness, vision changes and profuse sweating.  Patient states the next memory he has is waking up seated on a chair in a different location. Per patient, his colleague told him he had lost consciousness twice and was down for approximately 30 seconds during each episode.  Per patient report, there was no witnessed seizure-like activity and no tongue biting. Patient endorsing no symptoms at this time. Echo EF 55% with inf wall abnormality.     1) Multivessel CAD with mild ischemia on stress test   2) HTN   3) Syncope     - no chest pain or shortness of breath and tolerated BB overnight with no dizziness and lightheadedness and no events on telemetry   - s/p LHC and results as noted above and no intervention but intensive medical management - on ASA/ Plavix and Lipitor 80mg   - although patient had multivessel CAD, patient has a low risk stress test which shows mild ischemia with normal troponin/ Multivessel coronary artery disease including moderate-severe, prox-mid LAD disease (CathWorks angio-FFR 0.83)    - blood pressure controlled, on HCTZ 12.5mg po q daily Valsartan and on Toprol XL25mg po  q daily   - in the outpatient due to syncope and evidence of multivessel CAD will refer for outpatient MCOT - 30 days     Can be discharged home today plan for monitor outpatient stillpending insurance approval     Meena Gomez D.O. [facility]  Cardiology/Vascular Cardiology -[facility]   Telephone # [phone].
Patient seen and examined by me.      Patient alert and awake.  Chest- Bilateral Clear BS  Cardiac- S1 and S2  Abdomen- Soft    Assessment/Plan:    For Stress Test    I have discussed my recommendation with the PA which are outlined above.  Will follow.
Patient seen and examined by me.    T(C): 36.6 (10-19-24 @ 15:54), Max: 36.8 (10-19-24 @ 05:06)  HR: 73 (10-19-24 @ 15:54) (73 - 81)  BP: 102/63 (10-19-24 @ 15:54) (102/63 - 130/80)  RR: 18 (10-19-24 @ 15:54) (17 - 18)  SpO2: 94% (10-19-24 @ 15:54) (94% - 98%)  Patient alert and awake.  Chest- Bilateral Clear BS  Cardiac- S1 and S2  Abdomen- Soft    Assessment/Plan:    I have discussed my recommendation with the PA which are outlined above.  Will follow.
Patient seen and examined at bedside with no chest pain or shortness of breath overnight   LHC done shows multivessel CAD - 80% pRCA with mid RCA  (collateralized via the dLAD) mid LCX  collateralized via the LAD and 80% ostial prox D1 with LVEDP 6  TTEL:  1. Left ventricular wall thickness is mildly increased. Left ventricular systolic function is low normal with an ejection fraction of 53 % by Osullivan's method of disks with an ejection fraction visually estimated at 50 to 55 %. Mid inferolateral segment is abnormal.  NST:  ABNORMAL MILD ISCHEMIC STUDY. Patient achieved 7.00 METS, which is consistent with average exercise capacity. 105% MPHR. No cardiac symptoms. (+) Ischemic ECG changes. De La Cruz treadmill score = 0 (Moderate risk). Perfusion Findings: Mild ischemia in LPL/RPL territory.    74-year-old-year-old male with PMH of HTN, HLD, gout, asthma, ex-smoker presents to the ED c/o syncope.  Patient was BIBEMS with initially elevated HR to 150.  Patient returned to normal sinus rhythm prior to arrival in ED.  Patient states he was eating lunch when he experienced sudden onset dizziness, vision changes and profuse sweating.  Patient states the next memory he has is waking up seated on a chair in a different location. Per patient, his colleague told him he had lost consciousness twice and was down for approximately 30 seconds during each episode.  Per patient report, there was no witnessed seizure-like activity and no tongue biting. Patient endorsing no symptoms at this time. Echo EF 55% with inf wall abnormality.     1) Multivessel CAD - no intervention   2) HTN   3) Syncope     - no chest pain or shortness of breath   - s/p LHC and results as noted above and no intervention but intensive medical management - on ASA and Lipitor 80mg   - blood pressure controlled, on HCTZ 12.5mg po q daily Valsartan and will start Toprol XL25mg po  q daily   - in the outpatient due to syncope and evidence of multivessel CAD will refer for outpatient MCOT   - will follow up tomorrow and possible D/C home if no events     Meena Gomez D.O. [facility]  Cardiology/Vascular Cardiology -[facility]   Telephone # [phone]

## 2024-10-23 NOTE — DISCHARGE NOTE NURSING/CASE MANAGEMENT/SOCIAL WORK - PATIENT PORTAL LINK FT
You can access the FollowMyHealth Patient Portal offered by [facility] by registering at the following website: [email or web address]. By joining [facility]’s FollowMyHealth portal, you will also be able to view your health information using other applications (apps) compatible with our system.

## 2024-10-23 NOTE — PROGRESS NOTE ADULT - PROVIDER SPECIALTY LIST ADULT
Hospitalist
Hospitalist
Cardiology
Intervent Cardiology
Hospitalist
Hospitalist
Cardiology

## 2024-10-23 NOTE — PROGRESS NOTE ADULT - PROBLEM SELECTOR PLAN 2
-Carotid duplex benign  -Will need MCOT, trying to set up device to be picked up today after discharge  -As above

## 2024-10-23 NOTE — DISCHARGE NOTE NURSING/CASE MANAGEMENT/SOCIAL WORK - FINANCIAL ASSISTANCE
[facility] provides services at a reduced cost to those who are determined to be eligible through [facility]’s financial assistance program. Information regarding [facility]’s financial assistance program can be found by going to [email or web address] or by calling [phone].

## 2024-10-23 NOTE — PROGRESS NOTE ADULT - SUBJECTIVE AND OBJECTIVE BOX
[facility]                                                         CARDIOLOGY AT [facility]                                                                  [address], [place] [address]                                                         Telephone: [phone]. Fax:[phone]                                                                             PROGRESS NOTE    Reason for follow up: syncope  Update: no events      Review of symptoms:   Cardiac:  No chest pain. No dyspnea. No palpitations.  Respiratory: no cough. No dyspnea  Gastrointestinal: No diarrhea. No abdominal pain. No bleeding.   Neuro: No focal neuro complaints.    Vitals:  T(C): 36.3 (10-23-24 @ 08:45), Max: 37 (10-22-24 @ 12:30)  HR: 60 (10-23-24 @ 08:45) (58 - 79)  BP: 120/73 (10-23-24 @ 08:45) (94/56 - 142/91)  RR: 18 (10-23-24 @ 08:45) (9 - 20)  SpO2: 97% (10-23-24 @ 08:45) (96% - 99%)  Wt(kg): --  I&O's Summary    22 Oct 2024 07:01  -  23 Oct 2024 07:00  --------------------------------------------------------  IN: 480 mL / OUT: 2 mL / NET: 478 mL      Weight (kg): 82.6 (10-18 @ 13:24)    PHYSICAL EXAM:  Appearance: Comfortable. No acute distress  HEENT:  Atraumatic. Normocephalic.  Normal oral mucosa  Neurologic: A & O x 3, no gross focal deficits.  Cardiovascular: RRR S1 S2, No murmur, no rubs/gallops. No JVD  Respiratory: Lungs clear to auscultation, unlabored   Gastrointestinal:  Soft, Non-tender, + BS  Lower Extremities: 2+ Peripheral Pulses, No clubbing, cyanosis, or edema  Psychiatry: Patient is calm. No agitation.   Skin: warm and dry.  right radial site benign, no ecchymosis or hematoma, denies paresthesia or pain, +2 pulses distal     CURRENT CARDIAC MEDICATIONS:  hydrochlorothiazide 12.5 milliGRAM(s) Oral daily  metoprolol succinate ER 25 milliGRAM(s) Oral daily  valsartan 80 milliGRAM(s) Oral daily      CURRENT OTHER MEDICATIONS:  acetaminophen     Tablet .. 650 milliGRAM(s) Oral every 6 hours PRN Temp greater or equal to 38C (100.4F), Mild Pain (1 - 3)  melatonin 3 milliGRAM(s) Oral at bedtime PRN Insomnia  ondansetron Injectable 4 milliGRAM(s) IV Push every 8 hours PRN Nausea and/or Vomiting  aluminum hydroxide/magnesium hydroxide/simethicone Suspension 30 milliLiter(s) Oral every 4 hours PRN Dyspepsia  allopurinol 300 milliGRAM(s) Oral daily  atorvastatin 80 milliGRAM(s) Oral at bedtime  aspirin enteric coated 81 milliGRAM(s) Oral daily  enoxaparin Injectable 40 milliGRAM(s) SubCutaneous every 24 hours  influenza  Vaccine (HIGH DOSE) 0.5 milliLiter(s) IntraMuscular once  sodium chloride 0.9%. 1000 milliLiter(s) (70 mL/Hr) IV Continuous <Continuous>, Stop order after: 6 Hours      LABS:	 	                Lipid Profile: Date: 10-18 @ 14:51  Total cholesterol 161; Direct LDL: --; HDL: 43; Triglycerides:109    HgA1c:   TSH: Thyroid Stimulating Hormone, Serum: 0.75 uIU/mL  Thyroid Stimulating Hormone, Serum: 0.94 uIU/mL      TELEMETRY: SR      DIAGNOSTIC TESTING:  [ ] Echocardiogram:     < from: TTE W or WO Ultrasound Enhancing Agent (10.18.24 @ 19:26) >  CONCLUSIONS:      1. Left ventricular wall thickness is mildly increased. Left ventricular systolic function is low normal with an ejection fraction of 53 % by Osullivan's method of disks with an ejection fraction visually estimated at 50 to 55 %.   2. Mid inferolateral segment is abnormal.   3. Normal right ventricular cavity size and normal right ventricular systolic function.   4. Normal left and right atrial size.   5. There is focal calcification of the aortic valve leaflets.   6. There is calcification along the mitral-aortic intervalvular fibrosa.   7. There is moderate calcification of the mitral valve annulus.   8. Pulmonary artery systolic pressure could not be estimated.   9. No pericardial effusion seen.  10. No prior echocardiogram is available for comparison.    < end of copied text >  [ ]  Catheterization:    < from: Cardiac Catheterization (10.22.24 @ 11:46) >  Diagnostic Conclusions:     1. Multivessel coronary artery disease including moderate-severe  prox-mid LAD disease (CathWorks angio-FFR 0.83) with  chronic total occlusions of the mid RCA and mid LCx supplied via  distal LAD collaterals. Cannot rule out ischemic VT as  etiology for patient's syncope.      2. Focal, severe (80%) stenoses of prox RCA and ostial D1.      3. LVEDP 6 mmHg.      Recommendations:     Extended live cardiac rhythm monitoring to assess for malignant  arrhythmia.    Strongly consider coronary revascularization with any evidence of  ischemia.    Medical management of CAD and its risk factors.      < end of copied text >

## 2024-10-26 PROBLEM — E78.5 HYPERLIPIDEMIA, UNSPECIFIED: Chronic | Status: ACTIVE | Noted: 2024-10-18

## 2024-10-26 PROBLEM — I10 ESSENTIAL (PRIMARY) HYPERTENSION: Chronic | Status: ACTIVE | Noted: 2024-10-18

## 2024-10-26 PROBLEM — M10.9 GOUT, UNSPECIFIED: Chronic | Status: ACTIVE | Noted: 2024-10-19

## 2024-11-04 ENCOUNTER — APPOINTMENT (OUTPATIENT)
Dept: CARDIOLOGY | Facility: CLINIC | Age: 74
End: 2024-11-04
Payer: MEDICARE

## 2024-11-04 ENCOUNTER — NON-APPOINTMENT (OUTPATIENT)
Age: 74
End: 2024-11-04

## 2024-11-04 VITALS — DIASTOLIC BLOOD PRESSURE: 60 MMHG | HEART RATE: 55 BPM | SYSTOLIC BLOOD PRESSURE: 112 MMHG | OXYGEN SATURATION: 96 %

## 2024-11-04 VITALS
BODY MASS INDEX: 28.88 KG/M2 | HEIGHT: 67 IN | WEIGHT: 184 LBS | DIASTOLIC BLOOD PRESSURE: 58 MMHG | SYSTOLIC BLOOD PRESSURE: 100 MMHG

## 2024-11-04 DIAGNOSIS — Z83.3 FAMILY HISTORY OF DIABETES MELLITUS: ICD-10-CM

## 2024-11-04 DIAGNOSIS — Z87.01 PERSONAL HISTORY OF PNEUMONIA (RECURRENT): ICD-10-CM

## 2024-11-04 DIAGNOSIS — Z82.49 FAMILY HISTORY OF ISCHEMIC HEART DISEASE AND OTHER DISEASES OF THE CIRCULATORY SYSTEM: ICD-10-CM

## 2024-11-04 DIAGNOSIS — R55 SYNCOPE AND COLLAPSE: ICD-10-CM

## 2024-11-04 DIAGNOSIS — Z80.0 FAMILY HISTORY OF MALIGNANT NEOPLASM OF DIGESTIVE ORGANS: ICD-10-CM

## 2024-11-04 DIAGNOSIS — Z83.438 FAMILY HISTORY OF OTHER DISORDER OF LIPOPROTEIN METABOLISM AND OTHER LIPIDEMIA: ICD-10-CM

## 2024-11-04 DIAGNOSIS — I25.10 ATHEROSCLEROTIC HEART DISEASE OF NATIVE CORONARY ARTERY W/OUT ANGINA PECTORIS: ICD-10-CM

## 2024-11-04 DIAGNOSIS — Z78.9 OTHER SPECIFIED HEALTH STATUS: ICD-10-CM

## 2024-11-04 DIAGNOSIS — Z87.891 PERSONAL HISTORY OF NICOTINE DEPENDENCE: ICD-10-CM

## 2024-11-04 PROCEDURE — 93000 ELECTROCARDIOGRAM COMPLETE: CPT

## 2024-11-04 PROCEDURE — 99214 OFFICE O/P EST MOD 30 MIN: CPT

## 2024-11-04 RX ORDER — ASPIRIN 81 MG
81 TABLET, DELAYED RELEASE (ENTERIC COATED) ORAL DAILY
Refills: 0 | Status: ACTIVE | COMMUNITY

## 2024-11-04 RX ORDER — CLOPIDOGREL BISULFATE 75 MG/1
75 TABLET, FILM COATED ORAL DAILY
Qty: 1 | Refills: 3 | Status: ACTIVE | COMMUNITY
Start: 1900-01-01 | End: 1900-01-01

## 2024-11-04 RX ORDER — EZETIMIBE 10 MG/1
10 TABLET ORAL
Qty: 90 | Refills: 3 | Status: ACTIVE | COMMUNITY
Start: 2024-11-04 | End: 1900-01-01

## 2024-11-04 RX ORDER — ALLOPURINOL 300 MG/1
300 TABLET ORAL
Refills: 0 | Status: ACTIVE | COMMUNITY

## 2024-11-04 RX ORDER — METOPROLOL SUCCINATE 25 MG/1
25 TABLET, EXTENDED RELEASE ORAL DAILY
Qty: 90 | Refills: 3 | Status: ACTIVE | COMMUNITY
Start: 1900-01-01 | End: 1900-01-01

## 2024-11-04 RX ORDER — ATORVASTATIN CALCIUM 80 MG/1
80 TABLET, FILM COATED ORAL DAILY
Refills: 0 | Status: ACTIVE | COMMUNITY

## 2024-11-04 RX ORDER — VALSARTAN AND HYDROCHLOROTHIAZIDE 160; 12.5 MG/1; MG/1
160-12.5 TABLET, FILM COATED ORAL DAILY
Refills: 0 | Status: ACTIVE | COMMUNITY

## 2024-11-26 PROCEDURE — 80061 LIPID PANEL: CPT

## 2024-11-26 PROCEDURE — 93306 TTE W/DOPPLER COMPLETE: CPT

## 2024-11-26 PROCEDURE — 78452 HT MUSCLE IMAGE SPECT MULT: CPT | Mod: MC

## 2024-11-26 PROCEDURE — 85025 COMPLETE CBC W/AUTO DIFF WBC: CPT

## 2024-11-26 PROCEDURE — 84443 ASSAY THYROID STIM HORMONE: CPT

## 2024-11-26 PROCEDURE — 75571 CT HRT W/O DYE W/CA TEST: CPT | Mod: MC

## 2024-11-26 PROCEDURE — A9500: CPT

## 2024-11-26 PROCEDURE — 99285 EMERGENCY DEPT VISIT HI MDM: CPT | Mod: 25

## 2024-11-26 PROCEDURE — 71045 X-RAY EXAM CHEST 1 VIEW: CPT

## 2024-11-26 PROCEDURE — 80048 BASIC METABOLIC PNL TOTAL CA: CPT

## 2024-11-26 PROCEDURE — 70450 CT HEAD/BRAIN W/O DYE: CPT | Mod: MC

## 2024-11-26 PROCEDURE — 93458 L HRT ARTERY/VENTRICLE ANGIO: CPT

## 2024-11-26 PROCEDURE — 93017 CV STRESS TEST TRACING ONLY: CPT

## 2024-11-26 PROCEDURE — 83036 HEMOGLOBIN GLYCOSYLATED A1C: CPT

## 2024-11-26 PROCEDURE — 84484 ASSAY OF TROPONIN QUANT: CPT

## 2024-11-26 PROCEDURE — 82746 ASSAY OF FOLIC ACID SERUM: CPT

## 2024-11-26 PROCEDURE — 80053 COMPREHEN METABOLIC PANEL: CPT

## 2024-11-26 PROCEDURE — 36415 COLL VENOUS BLD VENIPUNCTURE: CPT

## 2024-11-26 PROCEDURE — 93005 ELECTROCARDIOGRAM TRACING: CPT

## 2024-11-26 PROCEDURE — C1894: CPT

## 2024-11-26 PROCEDURE — C1769: CPT

## 2024-11-26 PROCEDURE — 93880 EXTRACRANIAL BILAT STUDY: CPT

## 2024-11-26 PROCEDURE — C1887: CPT

## 2024-11-26 PROCEDURE — 85027 COMPLETE CBC AUTOMATED: CPT

## 2024-12-09 ENCOUNTER — APPOINTMENT (OUTPATIENT)
Dept: CARDIOLOGY | Facility: CLINIC | Age: 74
End: 2024-12-09
Payer: MEDICARE

## 2024-12-09 VITALS
OXYGEN SATURATION: 97 % | BODY MASS INDEX: 29.51 KG/M2 | WEIGHT: 188 LBS | HEIGHT: 67 IN | DIASTOLIC BLOOD PRESSURE: 65 MMHG | SYSTOLIC BLOOD PRESSURE: 121 MMHG | HEART RATE: 49 BPM

## 2024-12-09 DIAGNOSIS — E78.9 DISORDER OF LIPOPROTEIN METABOLISM, UNSPECIFIED: ICD-10-CM

## 2024-12-09 DIAGNOSIS — I10 ESSENTIAL (PRIMARY) HYPERTENSION: ICD-10-CM

## 2024-12-09 DIAGNOSIS — R55 SYNCOPE AND COLLAPSE: ICD-10-CM

## 2024-12-09 DIAGNOSIS — I25.10 ATHEROSCLEROTIC HEART DISEASE OF NATIVE CORONARY ARTERY W/OUT ANGINA PECTORIS: ICD-10-CM

## 2024-12-09 DIAGNOSIS — I34.81 NONRHEUMATIC MITRAL (VALVE) ANNULUS CALCIFICATION: ICD-10-CM

## 2024-12-09 DIAGNOSIS — E78.00 PURE HYPERCHOLESTEROLEMIA, UNSPECIFIED: ICD-10-CM

## 2024-12-09 PROCEDURE — 99214 OFFICE O/P EST MOD 30 MIN: CPT

## 2024-12-10 PROBLEM — I10 HTN (HYPERTENSION): Status: ACTIVE | Noted: 2024-12-10

## 2024-12-10 PROBLEM — I10 PRIMARY HYPERTENSION: Status: ACTIVE | Noted: 2024-12-10

## 2024-12-10 PROBLEM — E78.9 LIPID DISORDER: Status: ACTIVE | Noted: 2024-12-10

## 2024-12-10 PROBLEM — R55 SYNCOPE AND COLLAPSE: Status: RESOLVED | Noted: 2024-10-22 | Resolved: 2024-12-09

## 2024-12-10 PROBLEM — I10 HYPERTENSION: Status: RESOLVED | Noted: 2023-03-20 | Resolved: 2024-12-10

## 2024-12-10 PROBLEM — I34.81 MITRAL ANNULAR CALCIFICATION: Status: ACTIVE | Noted: 2024-12-10
